# Patient Record
Sex: MALE | Race: WHITE | NOT HISPANIC OR LATINO | Employment: OTHER | URBAN - METROPOLITAN AREA
[De-identification: names, ages, dates, MRNs, and addresses within clinical notes are randomized per-mention and may not be internally consistent; named-entity substitution may affect disease eponyms.]

---

## 2017-01-06 RX ORDER — GLIMEPIRIDE 4 MG/1
4 TABLET ORAL 2 TIMES DAILY
Qty: 60 TABLET | Refills: 11 | Status: SHIPPED | OUTPATIENT
Start: 2017-01-06 | End: 2017-12-22

## 2017-02-20 ENCOUNTER — LAB VISIT (OUTPATIENT)
Dept: LAB | Facility: HOSPITAL | Age: 62
End: 2017-02-20
Attending: INTERNAL MEDICINE
Payer: MEDICARE

## 2017-02-20 DIAGNOSIS — Z51.81 ENCOUNTER FOR THERAPEUTIC DRUG MONITORING: ICD-10-CM

## 2017-02-20 DIAGNOSIS — N13.5 STRICTURE OR KINKING OF URETER: Primary | ICD-10-CM

## 2017-02-20 DIAGNOSIS — D89.9 UNSPECIFIED DISORDER OF IMMUNE MECHANISM: ICD-10-CM

## 2017-02-20 LAB
ALBUMIN SERPL BCP-MCNC: 3.2 G/DL
ALP SERPL-CCNC: 37 U/L
ALT SERPL W/O P-5'-P-CCNC: 13 U/L
ANION GAP SERPL CALC-SCNC: 11 MMOL/L
AST SERPL-CCNC: 16 U/L
BASOPHILS # BLD AUTO: 0.02 K/UL
BASOPHILS NFR BLD: 0.4 %
BILIRUB SERPL-MCNC: 0.4 MG/DL
BUN SERPL-MCNC: 26 MG/DL
CALCIUM SERPL-MCNC: 9.4 MG/DL
CHLORIDE SERPL-SCNC: 102 MMOL/L
CO2 SERPL-SCNC: 25 MMOL/L
CREAT SERPL-MCNC: 1.8 MG/DL
CRP SERPL-MCNC: 18.1 MG/L
DIFFERENTIAL METHOD: ABNORMAL
EOSINOPHIL # BLD AUTO: 0.4 K/UL
EOSINOPHIL NFR BLD: 6.6 %
ERYTHROCYTE [DISTWIDTH] IN BLOOD BY AUTOMATED COUNT: 15.3 %
ERYTHROCYTE [SEDIMENTATION RATE] IN BLOOD BY WESTERGREN METHOD: 12 MM/HR
EST. GFR  (AFRICAN AMERICAN): 45.6 ML/MIN/1.73 M^2
EST. GFR  (NON AFRICAN AMERICAN): 39.5 ML/MIN/1.73 M^2
GLUCOSE SERPL-MCNC: 226 MG/DL
HCT VFR BLD AUTO: 35.8 %
HGB BLD-MCNC: 11.5 G/DL
LYMPHOCYTES # BLD AUTO: 1.6 K/UL
LYMPHOCYTES NFR BLD: 28.6 %
MCH RBC QN AUTO: 26.9 PG
MCHC RBC AUTO-ENTMCNC: 32.1 %
MCV RBC AUTO: 84 FL
MONOCYTES # BLD AUTO: 0.5 K/UL
MONOCYTES NFR BLD: 8.2 %
NEUTROPHILS # BLD AUTO: 3.1 K/UL
NEUTROPHILS NFR BLD: 56 %
PLATELET # BLD AUTO: 264 K/UL
PMV BLD AUTO: 10.1 FL
POTASSIUM SERPL-SCNC: 4.4 MMOL/L
PROT SERPL-MCNC: 6.4 G/DL
RBC # BLD AUTO: 4.27 M/UL
SODIUM SERPL-SCNC: 138 MMOL/L
URATE SERPL-MCNC: 3.4 MG/DL
WBC # BLD AUTO: 5.48 K/UL

## 2017-02-20 PROCEDURE — 85651 RBC SED RATE NONAUTOMATED: CPT

## 2017-02-20 PROCEDURE — 80053 COMPREHEN METABOLIC PANEL: CPT

## 2017-02-20 PROCEDURE — 36415 COLL VENOUS BLD VENIPUNCTURE: CPT | Mod: PO

## 2017-02-20 PROCEDURE — 85025 COMPLETE CBC W/AUTO DIFF WBC: CPT

## 2017-02-20 PROCEDURE — 84550 ASSAY OF BLOOD/URIC ACID: CPT

## 2017-02-20 PROCEDURE — 86140 C-REACTIVE PROTEIN: CPT

## 2017-02-27 RX ORDER — SIMVASTATIN 40 MG/1
TABLET, FILM COATED ORAL
Qty: 90 TABLET | Refills: 3 | Status: SHIPPED | OUTPATIENT
Start: 2017-02-27 | End: 2017-02-27 | Stop reason: SDUPTHER

## 2017-02-27 RX ORDER — SIMVASTATIN 40 MG/1
40 TABLET, FILM COATED ORAL NIGHTLY
Qty: 90 TABLET | Refills: 3 | Status: SHIPPED | OUTPATIENT
Start: 2017-02-27 | End: 2018-03-09 | Stop reason: SDUPTHER

## 2017-03-07 ENCOUNTER — TELEPHONE (OUTPATIENT)
Dept: INTERNAL MEDICINE | Facility: CLINIC | Age: 62
End: 2017-03-07

## 2017-03-07 NOTE — TELEPHONE ENCOUNTER
----- Message from Radha Calvillo sent at 3/7/2017  1:05 PM CST -----  Contact: kristin george  called gisselle status of prednisone rx...[phone]

## 2017-03-07 NOTE — TELEPHONE ENCOUNTER
S/w Joana , pharmacist at [facility]. She wanted to see if Dr. Whitley sent in a prescription for Prednisone to pharmacy. Pt stopped by pharmacy and told them that they need to give him prednisone and that he spoke with Dr. Rouse and he was prescribing it. I advised that pt has not seen Dr. Rouse since 12/01/16 and no notes in the system. She will return call if needed/TGD

## 2017-03-17 ENCOUNTER — TELEPHONE (OUTPATIENT)
Dept: INTERNAL MEDICINE | Facility: CLINIC | Age: 62
End: 2017-03-17

## 2017-03-17 NOTE — TELEPHONE ENCOUNTER
"S/w pt. Pt is requesting an order yo have  Ostomy supplies order sent to [facility]. I called pt and he advised that " Dr. Rouse has always done this for me in the past.". He does not have a gastroenterologist. [facility] 12/01/16. [place] 06/06/17. Will you approve this? I will fill out the appropriate forms if you agree. Please let me know. Thanks/TGD  "

## 2017-03-17 NOTE — TELEPHONE ENCOUNTER
----- Message from Kasie Castanon sent at 3/17/2017  9:15 AM CDT -----  Contact: [place]/[facility]  Pt needs to have a new order for his ostomy supplies. Pls include the clinic notes as well. Fax number is [phone]. Augusta can be reached at [phone].

## 2017-03-20 ENCOUNTER — TELEPHONE (OUTPATIENT)
Dept: INTERNAL MEDICINE | Facility: CLINIC | Age: 62
End: 2017-03-20

## 2017-03-20 NOTE — TELEPHONE ENCOUNTER
Notified pt. Medical Necessity form filled out for pt and faxed with most recent chart notes to [facility]/TGD

## 2017-03-20 NOTE — TELEPHONE ENCOUNTER
----- Message from Artur Crews sent at 3/20/2017  4:38 PM CDT -----  Contact: Augusta from [facility] she is calling  needing an order and latest office visit notes for ostomy supplies/ pt is almost out and can be reached at [phone]//bridgette/dbw     Fax # [phone]

## 2017-03-20 NOTE — TELEPHONE ENCOUNTER
S/w pt's sister. She agreed to tell pt that I am sending over the order for the Ostomy supplies to [facility]. Verbalized understanding/TGD

## 2017-03-30 ENCOUNTER — TELEPHONE (OUTPATIENT)
Dept: INTERNAL MEDICINE | Facility: CLINIC | Age: 62
End: 2017-03-30

## 2017-03-30 NOTE — TELEPHONE ENCOUNTER
----- Message from Olga Martínez sent at 3/30/2017  3:54 PM CDT -----  Contact: silvia/markell loaiza  Would like to speak to nurse about pt diabetic supplies. Please call back at [phone]. thanks

## 2017-03-31 ENCOUNTER — OFFICE VISIT (OUTPATIENT)
Dept: INTERNAL MEDICINE | Facility: CLINIC | Age: 62
End: 2017-03-31
Payer: MEDICARE

## 2017-03-31 VITALS
WEIGHT: 229.25 LBS | DIASTOLIC BLOOD PRESSURE: 86 MMHG | BODY MASS INDEX: 37.57 KG/M2 | RESPIRATION RATE: 20 BRPM | SYSTOLIC BLOOD PRESSURE: 172 MMHG | TEMPERATURE: 100 F | HEART RATE: 84 BPM | OXYGEN SATURATION: 97 %

## 2017-03-31 DIAGNOSIS — L03.311 CELLULITIS OF ABDOMINAL WALL: Primary | ICD-10-CM

## 2017-03-31 PROCEDURE — 1160F RVW MEDS BY RX/DR IN RCRD: CPT | Mod: S$GLB,,,

## 2017-03-31 PROCEDURE — 3079F DIAST BP 80-89 MM HG: CPT | Mod: S$GLB,,,

## 2017-03-31 PROCEDURE — 96372 THER/PROPH/DIAG INJ SC/IM: CPT | Mod: S$GLB,,,

## 2017-03-31 PROCEDURE — 99999 PR PBB SHADOW E&M-EST. PATIENT-LVL V: CPT | Mod: PBBFAC,,,

## 2017-03-31 PROCEDURE — 99213 OFFICE O/P EST LOW 20 MIN: CPT | Mod: 25,S$GLB,,

## 2017-03-31 PROCEDURE — 3077F SYST BP >= 140 MM HG: CPT | Mod: S$GLB,,,

## 2017-03-31 RX ORDER — CEFTRIAXONE 500 MG/1
500 INJECTION, POWDER, FOR SOLUTION INTRAMUSCULAR; INTRAVENOUS ONCE
Status: COMPLETED | OUTPATIENT
Start: 2017-03-31 | End: 2017-03-31

## 2017-03-31 RX ORDER — SULFAMETHOXAZOLE AND TRIMETHOPRIM 800; 160 MG/1; MG/1
1 TABLET ORAL 2 TIMES DAILY
Qty: 20 TABLET | Refills: 0 | Status: SHIPPED | OUTPATIENT
Start: 2017-03-31 | End: 2017-04-10

## 2017-03-31 RX ADMIN — CEFTRIAXONE 500 MG: 500 INJECTION, POWDER, FOR SOLUTION INTRAMUSCULAR; INTRAVENOUS at 04:03

## 2017-03-31 NOTE — MR AVS SNAPSHOT
[facility]  [address] [facility] [address]  Phone: [phone]                  Rickey Amaya Jr.   3/31/2017 3:00 PM   Office Visit    Description:  Male : 1955   Provider:  АНДРЕЙ Ordaz   Department:  Central - Internal Medicine           Reason for Visit     Rash           Diagnoses this Visit        Comments    Cellulitis of abdominal wall    -  Primary            To Do List           Future Appointments        Provider Department Dept Phone    2017 8:20 AM LABORATORY, [facility] - Laboratory [phone]    2017 2:00 PM Octavio Rouse MD [facility] [phone]      Goals (5 Years of Data)     None       These Medications        Disp Refills Start End    sulfamethoxazole-trimethoprim 800-160mg (BACTRIM DS) 800-160 mg Tab 20 tablet 0 3/31/2017 4/10/2017    Take 1 tablet by mouth 2 (two) times daily. - Oral    Pharmacy: [facility] [address] [place] [address] [facility] Ph #: [phone]         [facility]     [facility] Nurse Care Line -  Assistance  Unless otherwise directed by your provider, please contact [facility], our nurse care line that is available for  assistance.     Registered nurses in the [facility] provide: appointment scheduling, clinical advisement, health education, and other advisory services.  Call: [phone] (toll free)               Medications           Message regarding Medications     Verify the changes and/or additions to your medication regime listed below are the same as discussed with your clinician today.  If any of these changes or additions are incorrect, please notify your healthcare provider.        START taking these NEW medications        Refills    sulfamethoxazole-trimethoprim 800-160mg (BACTRIM DS) 800-160 mg Tab 0    Sig: Take 1 tablet by mouth 2 (two) times daily.    Class: Normal    Route: Oral      These medications were  administered today        Dose Freq    cefTRIAXone injection 500 mg 500 mg Once    Sig: Inject 0.5 g (500 mg total) into the muscle once.    Class: Normal    Route: Intramuscular           Verify that the below list of medications is an accurate representation of the medications you are currently taking.  If none reported, the list may be blank. If incorrect, please contact your healthcare provider. Carry this list with you in case of emergency.           Current Medications     allopurinol (ZYLOPRIM) 300 MG tablet     aspirin 81 mg Tab Take by mouth. 1 Tablet Oral Every day    blood sugar diagnostic (TRUETEST TEST STRIPS) Strp 1 strip by Misc.(Non-Drug; Combo Route) route 2 (two) times daily.    blood-glucose meter kit True Result Glucometer. Use as instructed    cinnamon bark (CINNAMON) 500 mg capsule Take 3,000 mg by mouth.    fenofibrate 160 MG Tab TAKE ONE TABLET BY MOUTH ONCE DAILY EVERY MORNING    ferrous sulfate 134 mg (27 mg iron) Tab Take 1 tablet by mouth 2 (two) times daily.    glimepiride (AMARYL) 4 MG tablet Take 1 tablet (4 mg total) by mouth 2 (two) times daily.    hydrochlorothiazide (HYDRODIURIL) 25 MG tablet Take 1 tablet (25 mg total) by mouth once daily. 1 Tablet Oral Every day    hydrOXYzine pamoate (VISTARIL) 25 MG Cap Take 1 capsule (25 mg total) by mouth every 8 (eight) hours as needed.    hyoscyamine (LEVSIN/SL) 0.125 mg Subl     metoprolol succinate (TOPROL-XL) 100 MG 24 hr tablet TAKE ONE TABLET BY MOUTH ONCE DAILY    multivitamin (THERAGRAN) per tablet Take by mouth. 1 Tablet Oral Every day    MULTIVITAMIN ORAL Take by mouth.    mycophenolate (CELLCEPT) 500 mg Tab Take 3 tablets (1,500 mg total) by mouth 2 (two) times daily.    nystatin-triamcinolone (MYCOLOG II) cream     omega 3-dha-epa-fish oil (OMEGA 3 FISH OIL) 900-1,400 mg CpDR Take 2 capsules by mouth 2 (two) times daily.    omega-3 fatty acids-vitamin E (FISH OIL) 1,000 mg Cap Take by mouth. 1 Capsule Oral Twice a day     oxybutynin (DITROPAN-XL) 10 MG 24 hr tablet     simvastatin (ZOCOR) 40 MG tablet Take 1 tablet (40 mg total) by mouth every evening.    tamsulosin (FLOMAX) 0.4 mg Cp24 Take by mouth. 1 Capsule, Ext Release 24 hr Oral At bedtime    TIZANIDINE HCL (TIZANIDINE ORAL) Take by mouth. 1 Capsule Oral Every day    triamcinolone acetonide 0.025% (KENALOG) 0.025 % cream Apply topically 2 (two) times daily.    sulfamethoxazole-trimethoprim 800-160mg (BACTRIM DS) 800-160 mg Tab Take 1 tablet by mouth 2 (two) times daily.           Clinical Reference Information           Your Vitals Were     BP Pulse Temp Resp    172/86 (BP Location: Left arm, Patient Position: Sitting, BP Method: Manual) 84 99.5 °F (37.5 °C) (Tympanic) 20    Weight SpO2 BMI    104 kg (229 lb 4.5 oz) 97% 37.57 kg/m2      Blood Pressure          Most Recent Value    BP  (!)  172/86 [he has been on steriods and this causes his BP to be elevate]      Allergies as of 3/31/2017     Diazepam    Hydrocodone-acetaminophen    Metformin    Morphine    Propoxyphene N-acetaminophen    Propoxyphene-acetaminophen      Immunizations Administered on Date of Encounter - 3/31/2017     None      Administrations This Visit     cefTRIAXone injection 500 mg     Admin Date Action Dose Route Administered By             03/31/2017 Given 500 mg Intramuscular YAZ [facility] Sign-Up     Activating your [facility] account is as easy as 1-2-3!     1) Visit [email or web address], select Sign Up Now, enter this activation code and your date of birth, then select Next.  Activation code not generated  Current Patient Portal Status: Account disabled      2) Create a username and password to use when you visit [facility] in the future and select a security question in case you lose your password and select Next.    3) Enter your e-mail address and click Sign Up!    Additional Information  If you have questions, please e-mail [email or web address] or call  [phone] to talk to our [facility] staff. Remember, [facility] is NOT to be used for urgent needs. For medical emergencies, dial 911.         Language Assistance Services     ATTENTION: Language assistance services are available, free of charge. Please call [phone].      ATENCIÓN: Si habla esther, tiene a chester disposición servicios gratuitos de asistencia lingüística. Llame al [phone].     [facility] Ý: N?u b?n nói Ti?ng Vi?t, có các d?ch v? h? tr? ngôn ng? mi?n phí dành cho b?n. G?i s? [phone].         [facility] complies with applicable Federal civil rights laws and does not discriminate on the basis of race, color, national origin, age, disability, or sex.

## 2017-03-31 NOTE — PROGRESS NOTES
Subjective:       Patient ID: Rickey Amaya Jr. is a 62 y.o. male.    Chief Complaint: Rash (to right side, he believes it may be cellulitis , he gets this often and has to be treated with antibiotics )    HPI   Patient presents with a 2 day complaint of redness erythema and tenderness to his right abdominal wall.  The patient has been diagnosed in the past with cellulitis.  This is a recurrent problem he is seen fairly frequently for this.  He was told that it can be caused by the ileostomy bag on his side.  He has not taken anything for the pain.  He says his symptoms are constant and moderate in intensity.  He denies any associated symptoms.  He cannot identify exacerbating or mitigating factors.     Review of Systems   Constitutional: Negative for activity change, appetite change, fatigue and unexpected weight change.   HENT: Negative.    Eyes: Negative.    Respiratory: Negative for cough, chest tightness, shortness of breath and wheezing.    Cardiovascular: Negative for chest pain, palpitations and leg swelling.   Gastrointestinal: Negative for constipation, diarrhea, nausea and vomiting.   Endocrine: Negative.    Genitourinary: Negative.    Musculoskeletal: Negative.    Skin: Positive for color change.        Erythema and tenderness to palpation on right abdominal wall   Allergic/Immunologic: Negative.    Neurological: Negative for dizziness, weakness and light-headedness.   Hematological: Negative.    Psychiatric/Behavioral: Negative for sleep disturbance.         Objective:      Physical Exam   Constitutional: He is oriented to person, place, and time. Vital signs are normal. He appears well-developed and well-nourished. He is active and cooperative. No distress.   HENT:   Head: Normocephalic and atraumatic.   Eyes: Conjunctivae are normal. Pupils are equal, round, and reactive to light. No scleral icterus.   Neck: Normal range of motion. Neck supple.   Cardiovascular: Normal rate, regular rhythm, normal  heart sounds and intact distal pulses.  Exam reveals no gallop and no friction rub.    No murmur heard.  Pulmonary/Chest: Effort normal and breath sounds normal. No respiratory distress. He has no wheezes. He has no rales. He exhibits no tenderness.   Abdominal:       Musculoskeletal: Normal range of motion. He exhibits no edema or tenderness.   Neurological: He is alert and oriented to person, place, and time. He exhibits normal muscle tone. Coordination normal.   Skin: Skin is warm and dry. No rash noted. He is not diaphoretic. No erythema. No pallor.   Psychiatric: He has a normal mood and affect. His speech is normal and behavior is normal. Judgment and thought content normal.       Assessment:       1. Cellulitis of abdominal wall          Plan:   Cellulitis of abdominal wall  -     sulfamethoxazole-trimethoprim 800-160mg (BACTRIM DS) 800-160 mg Tab; Take 1 tablet by mouth 2 (two) times daily.  Dispense: 20 tablet; Refill: 0  -     cefTRIAXone injection 500 mg; Inject 0.5 g (500 mg total) into the muscle once.            Disclaimer: This note is prepared using voice recognition software.  As such there may be errors in the dictation.  It has not been proofread.

## 2017-03-31 NOTE — TELEPHONE ENCOUNTER
----- Message from Vita Alvarez sent at 3/31/2017 12:24 PM CDT -----  Call pt at [phone]//pt say call him, it urgent please//melia whitmore

## 2017-04-18 ENCOUNTER — TELEPHONE (OUTPATIENT)
Dept: INTERNAL MEDICINE | Facility: CLINIC | Age: 62
End: 2017-04-18

## 2017-04-18 ENCOUNTER — OFFICE VISIT (OUTPATIENT)
Dept: INTERNAL MEDICINE | Facility: CLINIC | Age: 62
End: 2017-04-18
Payer: MEDICARE

## 2017-04-18 VITALS
WEIGHT: 224.44 LBS | TEMPERATURE: 98 F | HEIGHT: 66 IN | DIASTOLIC BLOOD PRESSURE: 82 MMHG | BODY MASS INDEX: 36.07 KG/M2 | OXYGEN SATURATION: 95 % | SYSTOLIC BLOOD PRESSURE: 138 MMHG | HEART RATE: 88 BPM

## 2017-04-18 DIAGNOSIS — R11.2 NON-INTRACTABLE VOMITING WITH NAUSEA, UNSPECIFIED VOMITING TYPE: ICD-10-CM

## 2017-04-18 DIAGNOSIS — R10.9 ACUTE ABDOMINAL PAIN: Primary | ICD-10-CM

## 2017-04-18 DIAGNOSIS — Z93.3 COLOSTOMY STATUS: ICD-10-CM

## 2017-04-18 PROCEDURE — 1160F RVW MEDS BY RX/DR IN RCRD: CPT | Mod: S$GLB,,, | Performed by: FAMILY MEDICINE

## 2017-04-18 PROCEDURE — 99999 PR PBB SHADOW E&M-EST. PATIENT-LVL III: CPT | Mod: PBBFAC,,, | Performed by: FAMILY MEDICINE

## 2017-04-18 PROCEDURE — 3079F DIAST BP 80-89 MM HG: CPT | Mod: S$GLB,,, | Performed by: FAMILY MEDICINE

## 2017-04-18 PROCEDURE — 99213 OFFICE O/P EST LOW 20 MIN: CPT | Mod: S$GLB,,, | Performed by: FAMILY MEDICINE

## 2017-04-18 PROCEDURE — 3075F SYST BP GE 130 - 139MM HG: CPT | Mod: S$GLB,,, | Performed by: FAMILY MEDICINE

## 2017-04-18 NOTE — PROGRESS NOTES
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  He's had several episodes of vomiting.  Denies any fever he's had a colostomy for colon cancer many years back and also has a diagnosis of retroperitoneal fibrosis.      ROS:  Review of Systems   Constitutional: Negative for activity change, chills, fatigue, fever and unexpected weight change.   HENT: Negative for congestion, ear discharge, ear pain, hearing loss, postnasal drip and rhinorrhea.    Eyes: Negative for pain and visual disturbance.   Respiratory: Negative for cough, chest tightness and shortness of breath.    Cardiovascular: Negative for chest pain and palpitations.   Gastrointestinal: Positive for abdominal distention, abdominal pain, constipation, nausea and vomiting. Negative for diarrhea.   Endocrine: Negative for heat intolerance.   Genitourinary: Negative for dysuria, flank pain, frequency and hematuria.   Musculoskeletal: Negative for back pain, gait problem and neck pain.   Skin: Negative for color change and rash.   Neurological: Negative for dizziness, tremors, seizures, numbness and headaches.   Psychiatric/Behavioral: Negative for agitation, hallucinations, self-injury, sleep disturbance and suicidal ideas. The patient is not nervous/anxious.        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, stage III     Calculus of kidney     Chronic kidney disease, stage III (moderate)     Colostomy status     DM (diabetes mellitus), type 2 with complications     DM type 2 causing CKD stage 3     DM type 2 with diabetic mixed hyperlipidemia     History of colon cancer 2005    History of gout     Hyperlipidemia  "associated with type 2 diabetes mellitus     Hypertension associated with diabetes     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       Social History:  Social History     Social History    Marital status:      Spouse name: N/A    Number of children: N/A    Years of education: N/A     Social History Main Topics    Smoking status: Former Smoker     Packs/day: 2.50     Years: 35.00     Types: Cigarettes     Quit date: 7/7/2005    Smokeless tobacco: Never Used    Alcohol use No    Drug use: No    Sexual activity: Not Asked     Other Topics Concern    None     Social History Narrative       Family History:   family history includes Diabetes in his mother.    Health Maintenance   Topic Date Due    TETANUS VACCINE  03/24/1972    Pneumococcal PPSV23 (Medium Risk) (1) 03/24/1972    Zoster Vaccine  03/24/2014    PROSTATE-SPECIFIC ANTIGEN  08/21/2015    Foot Exam  09/02/2016    Eye Exam  10/01/2016    Hemoglobin A1c  05/28/2017    Urine Microalbumin  07/28/2017    Lipid Panel  11/28/2017    Colonoscopy  01/27/2026    Hepatitis C Screening  Completed    Influenza Vaccine  Completed       Physical Exam:    Vital Signs  Temp: 98.3 °F (36.8 °C)  Temp src: Tympanic  Pulse: 88  SpO2: 95 %  BP: 138/82  Pain Score:   8  Pain Loc: Abdomen  Height and Weight  Height: 5' 6" (167.6 cm)  Weight: 101.8 kg (224 lb 6.9 oz)  BSA (Calculated - sq m): 2.18 sq meters  BMI (Calculated): 36.3  Weight in (lb) to have BMI = 25: 154.6]    Body mass index is 36.22 kg/(m^2).    Physical Exam   Constitutional: He appears well-developed. He appears ill.   HENT:   Mouth/Throat: Oropharynx is clear and moist.   Eyes: Conjunctivae are normal. Pupils are equal, round, and reactive to light.   Neck: Normal range of motion. Neck supple.   Cardiovascular: Normal rate, regular rhythm and normal heart sounds.    No murmur heard.  Pulmonary/Chest: Effort normal and breath sounds normal. No respiratory distress. He has no wheezes. He has " no rales. He exhibits no tenderness.   Abdominal: Soft. He exhibits no distension and no mass. There is generalized tenderness. There is no guarding.   Musculoskeletal: He exhibits no edema or tenderness.   Lymphadenopathy:     He has no cervical adenopathy.   Neurological: He is alert.   Skin: Skin is warm and dry.   Psychiatric: He has a normal mood and affect. His behavior is normal. Judgment and thought content normal.       Lab Results   Component Value Date    CHOL 118 (L) 11/28/2016    CHOL 152 07/28/2016    CHOL 142 12/04/2015    TRIG 264 (H) 11/28/2016    TRIG 422 (H) 07/28/2016    TRIG 177 (H) 12/04/2015    HDL 30 (L) 11/28/2016    HDL 33 (L) 07/28/2016    HDL 40 12/04/2015    TOTALCHOLEST 3.9 11/28/2016    TOTALCHOLEST 4.6 07/28/2016    TOTALCHOLEST 3.6 12/04/2015    NONHDLCHOL 88 11/28/2016    NONHDLCHOL 119 07/28/2016    NONHDLCHOL 102 12/04/2015       Lab Results   Component Value Date    HGBA1C 7.8 (H) 11/28/2016       Assessment:      ICD-10-CM ICD-9-CM   1. Acute abdominal pain R10.9 789.00     338.19   2. Non-intractable vomiting with nausea, unspecified vomiting type R11.2 787.01   3. Colostomy status Z93.3 V44.3         Plan:  Patient will need urgent ED evaluation, suspect small bowel obstruction.  He will be sent to ED via ambulance  to [facility]    No orders of the defined types were placed in this encounter.      Current Outpatient Prescriptions   Medication Sig Dispense Refill    allopurinol (ZYLOPRIM) 300 MG tablet       aspirin 81 mg Tab Take by mouth. 1 Tablet Oral Every day      blood sugar diagnostic (TRUETEST TEST STRIPS) Strp 1 strip by Misc.(Non-Drug; Combo Route) route 2 (two) times daily. 200 strip 3    blood-glucose meter kit True Result Glucometer. Use as instructed 1 each 0    cinnamon bark (CINNAMON) 500 mg capsule Take 3,000 mg by mouth.      fenofibrate 160 MG Tab TAKE ONE TABLET BY MOUTH ONCE DAILY EVERY MORNING 30 tablet 11    ferrous sulfate 134 mg  (27 mg iron) Tab Take 1 tablet by mouth 2 (two) times daily.      glimepiride (AMARYL) 4 MG tablet Take 1 tablet (4 mg total) by mouth 2 (two) times daily. 60 tablet 11    hydrochlorothiazide (HYDRODIURIL) 25 MG tablet Take 1 tablet (25 mg total) by mouth once daily. 1 Tablet Oral Every day 30 tablet 11    hydrOXYzine pamoate (VISTARIL) 25 MG Cap Take 1 capsule (25 mg total) by mouth every 8 (eight) hours as needed. 90 capsule 11    hyoscyamine (LEVSIN/SL) 0.125 mg Subl       metoprolol succinate (TOPROL-XL) 100 MG 24 hr tablet TAKE ONE TABLET BY MOUTH ONCE DAILY 90 tablet 3    multivitamin (THERAGRAN) per tablet Take by mouth. 1 Tablet Oral Every day      nystatin-triamcinolone (MYCOLOG II) cream       omega 3-dha-epa-fish oil (OMEGA 3 FISH OIL) 900-1,400 mg CpDR Take 2 capsules by mouth 2 (two) times daily.      oxybutynin (DITROPAN-XL) 10 MG 24 hr tablet       simvastatin (ZOCOR) 40 MG tablet Take 1 tablet (40 mg total) by mouth every evening. 90 tablet 3    tamsulosin (FLOMAX) 0.4 mg Cp24 Take by mouth. 1 Capsule, Ext Release 24 hr Oral At bedtime      TIZANIDINE HCL (TIZANIDINE ORAL) Take by mouth. 1 Capsule Oral Every day      MULTIVITAMIN ORAL Take by mouth.      mycophenolate (CELLCEPT) 500 mg Tab Take 3 tablets (1,500 mg total) by mouth 2 (two) times daily. (Patient taking differently: Take 2,000 mg by mouth 2 (two) times daily. 2 tablets in AM and 2 tablets in PM) 180 tablet 11    omega-3 fatty acids-vitamin E (FISH OIL) 1,000 mg Cap Take by mouth. 1 Capsule Oral Twice a day      triamcinolone acetonide 0.025% (KENALOG) 0.025 % cream Apply topically 2 (two) times daily. 15 g 0     No current facility-administered medications for this visit.        There are no discontinued medications.    No Follow-up on file.      Dr John Alonso MD    Disclaimer: This note is prepared using voice recognition system and as such is likely to have errors and is not proof read.

## 2017-04-18 NOTE — TELEPHONE ENCOUNTER
Spoke with pt.  He reports that he has a colostomy bag and that he has taken a bottle of Mag citrate and no results.  Informed pt to come in at 11:00am today to be seen.  Pt voiced understanding.

## 2017-04-18 NOTE — MR AVS SNAPSHOT
[facility]  [id number] [place] [address]  Phone: [phone]                  Rickey Amaya Jr.   2017 4:00 PM   Office Visit    Description:  Male : 1955   Provider:  John Alonso MD   Department:  Central - Internal Medicine           Reason for Visit     Abdominal Pain           Diagnoses this Visit        Comments    Acute abdominal pain    -  Primary     Non-intractable vomiting with nausea, unspecified vomiting type         Colostomy status                To Do List           Future Appointments        Provider Department Dept Phone    2017 4:00 PM John Alonso MD [facility] [phone]    2017 8:20 AM LABORATORY, [facility] [phone]    2017 2:00 PM Octavio Rouse MD [facility] [phone]      Goals (5 Years of Data)     None      [facility] On Call     [facility] On Call Nurse Care Line -  Assistance  Unless otherwise directed by your provider, please contact [facility], our nurse care line that is available for  assistance.     Registered nurses in the [facility] On Call Center provide: appointment scheduling, clinical advisement, health education, and other advisory services.  Call: [phone] (toll free)               Medications           Message regarding Medications     Verify the changes and/or additions to your medication regime listed below are the same as discussed with your clinician today.  If any of these changes or additions are incorrect, please notify your healthcare provider.             Verify that the below list of medications is an accurate representation of the medications you are currently taking.  If none reported, the list may be blank. If incorrect, please contact your healthcare provider. Carry this list with you in case of emergency.           Current Medications     allopurinol (ZYLOPRIM) 300 MG tablet     aspirin 81 mg Tab Take by mouth. 1  "Tablet Oral Every day    blood sugar diagnostic (TRUETEST TEST STRIPS) Strp 1 strip by Misc.(Non-Drug; Combo Route) route 2 (two) times daily.    blood-glucose meter kit True Result Glucometer. Use as instructed    cinnamon bark (CINNAMON) 500 mg capsule Take 3,000 mg by mouth.    fenofibrate 160 MG Tab TAKE ONE TABLET BY MOUTH ONCE DAILY EVERY MORNING    ferrous sulfate 134 mg (27 mg iron) Tab Take 1 tablet by mouth 2 (two) times daily.    glimepiride (AMARYL) 4 MG tablet Take 1 tablet (4 mg total) by mouth 2 (two) times daily.    hydrochlorothiazide (HYDRODIURIL) 25 MG tablet Take 1 tablet (25 mg total) by mouth once daily. 1 Tablet Oral Every day    hydrOXYzine pamoate (VISTARIL) 25 MG Cap Take 1 capsule (25 mg total) by mouth every 8 (eight) hours as needed.    hyoscyamine (LEVSIN/SL) 0.125 mg Subl     metoprolol succinate (TOPROL-XL) 100 MG 24 hr tablet TAKE ONE TABLET BY MOUTH ONCE DAILY    multivitamin (THERAGRAN) per tablet Take by mouth. 1 Tablet Oral Every day    nystatin-triamcinolone (MYCOLOG II) cream     omega 3-dha-epa-fish oil (OMEGA 3 FISH OIL) 900-1,400 mg CpDR Take 2 capsules by mouth 2 (two) times daily.    oxybutynin (DITROPAN-XL) 10 MG 24 hr tablet     simvastatin (ZOCOR) 40 MG tablet Take 1 tablet (40 mg total) by mouth every evening.    tamsulosin (FLOMAX) 0.4 mg Cp24 Take by mouth. 1 Capsule, Ext Release 24 hr Oral At bedtime    TIZANIDINE HCL (TIZANIDINE ORAL) Take by mouth. 1 Capsule Oral Every day    MULTIVITAMIN ORAL Take by mouth.    mycophenolate (CELLCEPT) 500 mg Tab Take 3 tablets (1,500 mg total) by mouth 2 (two) times daily.    omega-3 fatty acids-vitamin E (FISH OIL) 1,000 mg Cap Take by mouth. 1 Capsule Oral Twice a day    triamcinolone acetonide 0.025% (KENALOG) 0.025 % cream Apply topically 2 (two) times daily.           Clinical Reference Information           Your Vitals Were     BP Pulse Temp Height Weight SpO2    138/82 88 98.3 °F (36.8 °C) (Tympanic) 5' 6" (1.676 m) 101.8 " kg (224 lb 6.9 oz) 95%    BMI                36.22 kg/m2          Blood Pressure          Most Recent Value    BP  138/82      Allergies as of 4/18/2017     Diazepam    Hydrocodone-acetaminophen    Metformin    Morphine    Propoxyphene N-acetaminophen    Propoxyphene-acetaminophen      Immunizations Administered on Date of Encounter - 4/18/2017     None      [facility] Sign-Up     Activating your [facility] account is as easy as 1-2-3!     1) Visit [email or web address], select Sign Up Now, enter this activation code and your date of birth, then select Next.  Activation code not generated  Current Patient Portal Status: Account disabled      2) Create a username and password to use when you visit [facility] in the future and select a security question in case you lose your password and select Next.    3) Enter your e-mail address and click Sign Up!    Additional Information  If you have questions, please e-mail [email or web address] or call [phone] to talk to our [facility] staff. Remember, [facility] is NOT to be used for urgent needs. For medical emergencies, dial 911.         Language Assistance Services     ATTENTION: Language assistance services are available, free of charge. Please call [phone].      ATENCIÓN: Si habla español, tiene a chester disposición servicios gratuitos de asistencia lingüística. Llame al [phone].     CHÚ Ý: N?u b?n nói Ti?ng Vi?t, có các d?ch v? h? tr? ngôn ng? mi?n phí dành cho b?n. G?i s? [phone].         [facility] complies with applicable Federal civil rights laws and does not discriminate on the basis of race, color, national origin, age, disability, or sex.

## 2017-04-18 NOTE — TELEPHONE ENCOUNTER
----- Message from Beatrice Hong sent at 4/18/2017  9:28 AM CDT -----  Contact: Patient  Patient needs to speak to nurse about his stomach issues, he does have an appointment today at 4:00 but still wants to speak to nurse, please call him back at [phone]. Thank you

## 2017-04-24 ENCOUNTER — TELEPHONE (OUTPATIENT)
Dept: INTERNAL MEDICINE | Facility: CLINIC | Age: 62
End: 2017-04-24

## 2017-04-24 NOTE — TELEPHONE ENCOUNTER
----- Message from Shalini Weiss LPN sent at 4/24/2017  2:36 PM CDT -----  I faxed a medical neccesity form over on this pt that I received from [employer]. Pt is requesting deodorizer and needs dr to sign and fax form.   Thanks Fadumo

## 2017-05-26 ENCOUNTER — LAB VISIT (OUTPATIENT)
Dept: LAB | Facility: HOSPITAL | Age: 62
End: 2017-05-26
Attending: INTERNAL MEDICINE
Payer: MEDICARE

## 2017-05-26 ENCOUNTER — OFFICE VISIT (OUTPATIENT)
Dept: INTERNAL MEDICINE | Facility: CLINIC | Age: 62
End: 2017-05-26
Payer: MEDICARE

## 2017-05-26 VITALS
HEART RATE: 97 BPM | TEMPERATURE: 102 F | OXYGEN SATURATION: 97 % | HEIGHT: 66 IN | SYSTOLIC BLOOD PRESSURE: 152 MMHG | DIASTOLIC BLOOD PRESSURE: 80 MMHG | BODY MASS INDEX: 35.04 KG/M2 | WEIGHT: 218.06 LBS

## 2017-05-26 DIAGNOSIS — E11.59 HYPERTENSION ASSOCIATED WITH DIABETES: ICD-10-CM

## 2017-05-26 DIAGNOSIS — E11.8 TYPE 2 DIABETES MELLITUS WITH COMPLICATION, WITHOUT LONG-TERM CURRENT USE OF INSULIN: ICD-10-CM

## 2017-05-26 DIAGNOSIS — N18.30 TYPE 2 DIABETES MELLITUS WITH STAGE 3 CHRONIC KIDNEY DISEASE, WITHOUT LONG-TERM CURRENT USE OF INSULIN: ICD-10-CM

## 2017-05-26 DIAGNOSIS — L03.311 CELLULITIS, ABDOMINAL WALL: Primary | ICD-10-CM

## 2017-05-26 DIAGNOSIS — E11.22 TYPE 2 DIABETES MELLITUS WITH STAGE 3 CHRONIC KIDNEY DISEASE, WITHOUT LONG-TERM CURRENT USE OF INSULIN: ICD-10-CM

## 2017-05-26 DIAGNOSIS — E11.69 DM TYPE 2 WITH DIABETIC MIXED HYPERLIPIDEMIA: ICD-10-CM

## 2017-05-26 DIAGNOSIS — I13.10 BENIGN HYPERTENSIVE HEART AND KIDNEY DISEASE WITH CHRONIC KIDNEY DISEASE, STAGE III: ICD-10-CM

## 2017-05-26 DIAGNOSIS — I15.2 HYPERTENSION ASSOCIATED WITH DIABETES: ICD-10-CM

## 2017-05-26 DIAGNOSIS — Z87.39 HISTORY OF GOUT: ICD-10-CM

## 2017-05-26 DIAGNOSIS — N18.30 BENIGN HYPERTENSIVE HEART AND KIDNEY DISEASE WITH CHRONIC KIDNEY DISEASE, STAGE III: ICD-10-CM

## 2017-05-26 DIAGNOSIS — E78.2 DM TYPE 2 WITH DIABETIC MIXED HYPERLIPIDEMIA: ICD-10-CM

## 2017-05-26 LAB
ALBUMIN SERPL BCP-MCNC: 3.5 G/DL
ALP SERPL-CCNC: 31 U/L
ALT SERPL W/O P-5'-P-CCNC: 12 U/L
ANION GAP SERPL CALC-SCNC: 10 MMOL/L
AST SERPL-CCNC: 19 U/L
BASOPHILS # BLD AUTO: 0.02 K/UL
BASOPHILS NFR BLD: 0.3 %
BILIRUB SERPL-MCNC: 0.4 MG/DL
BUN SERPL-MCNC: 35 MG/DL
CALCIUM SERPL-MCNC: 9.1 MG/DL
CHLORIDE SERPL-SCNC: 108 MMOL/L
CHOLEST/HDLC SERPL: 3.8 {RATIO}
CO2 SERPL-SCNC: 20 MMOL/L
CREAT SERPL-MCNC: 2.8 MG/DL
DIFFERENTIAL METHOD: ABNORMAL
EOSINOPHIL # BLD AUTO: 0.4 K/UL
EOSINOPHIL NFR BLD: 5 %
ERYTHROCYTE [DISTWIDTH] IN BLOOD BY AUTOMATED COUNT: 15.2 %
EST. GFR  (AFRICAN AMERICAN): 26.5 ML/MIN/1.73 M^2
EST. GFR  (NON AFRICAN AMERICAN): 23 ML/MIN/1.73 M^2
GLUCOSE SERPL-MCNC: 138 MG/DL
HCT VFR BLD AUTO: 36.2 %
HDL/CHOLESTEROL RATIO: 26.6 %
HDLC SERPL-MCNC: 124 MG/DL
HDLC SERPL-MCNC: 33 MG/DL
HGB BLD-MCNC: 11.7 G/DL
LDLC SERPL CALC-MCNC: 19.6 MG/DL
LYMPHOCYTES # BLD AUTO: 1.8 K/UL
LYMPHOCYTES NFR BLD: 25.6 %
MCH RBC QN AUTO: 27.1 PG
MCHC RBC AUTO-ENTMCNC: 32.3 %
MCV RBC AUTO: 84 FL
MONOCYTES # BLD AUTO: 0.4 K/UL
MONOCYTES NFR BLD: 5.3 %
NEUTROPHILS # BLD AUTO: 4.5 K/UL
NEUTROPHILS NFR BLD: 63.8 %
NONHDLC SERPL-MCNC: 91 MG/DL
PLATELET # BLD AUTO: 311 K/UL
PMV BLD AUTO: 10.4 FL
POTASSIUM SERPL-SCNC: 4.4 MMOL/L
PROT SERPL-MCNC: 6.8 G/DL
RBC # BLD AUTO: 4.32 M/UL
SODIUM SERPL-SCNC: 138 MMOL/L
TRIGL SERPL-MCNC: 357 MG/DL
TSH SERPL DL<=0.005 MIU/L-ACNC: 2.29 UIU/ML
URATE SERPL-MCNC: 3.8 MG/DL
WBC # BLD AUTO: 6.99 K/UL

## 2017-05-26 PROCEDURE — 3045F PR MOST RECENT HEMOGLOBIN A1C LEVEL 7.0-9.0%: CPT | Mod: S$GLB,,, | Performed by: INTERNAL MEDICINE

## 2017-05-26 PROCEDURE — 99999 PR PBB SHADOW E&M-EST. PATIENT-LVL III: CPT | Mod: PBBFAC,,, | Performed by: INTERNAL MEDICINE

## 2017-05-26 PROCEDURE — 99214 OFFICE O/P EST MOD 30 MIN: CPT | Mod: 25,S$GLB,, | Performed by: INTERNAL MEDICINE

## 2017-05-26 PROCEDURE — 96372 THER/PROPH/DIAG INJ SC/IM: CPT | Mod: 59,S$GLB,, | Performed by: INTERNAL MEDICINE

## 2017-05-26 PROCEDURE — 3060F POS MICROALBUMINURIA REV: CPT | Mod: 8P,S$GLB,, | Performed by: INTERNAL MEDICINE

## 2017-05-26 RX ORDER — SULFAMETHOXAZOLE AND TRIMETHOPRIM 800; 160 MG/1; MG/1
1 TABLET ORAL 2 TIMES DAILY
Qty: 20 TABLET | Refills: 0 | Status: SHIPPED | OUTPATIENT
Start: 2017-05-26 | End: 2017-08-10

## 2017-05-26 RX ORDER — CEFTRIAXONE 1 G/1
1 INJECTION, POWDER, FOR SOLUTION INTRAMUSCULAR; INTRAVENOUS
Status: COMPLETED | OUTPATIENT
Start: 2017-05-26 | End: 2017-05-26

## 2017-05-26 RX ADMIN — CEFTRIAXONE 1 G: 1 INJECTION, POWDER, FOR SOLUTION INTRAMUSCULAR; INTRAVENOUS at 04:05

## 2017-05-26 NOTE — PROGRESS NOTES
Ceftriaxone 1 gram reconstituted with Lidocaine 1% 2.1 ml.  IM right ventrogluteal.  Lot# [id number] [place] 08/2019 [facility] ([place]?).  Pt advised to wait in clinic 20 minutes to monitor for side effects.  Pt voiced understanding and tolerated injection well.

## 2017-05-26 NOTE — PROGRESS NOTES
"HPI:  Pt is a 61 y/o male with a hx of recurrent abd wall cellulitis felt to be due to his colostomy site. Last episode two mths ago. Presents today with c/o red, warm rash with fever for last 12 hours.     Current meds have been verified and updated per the EMR  Exam:BP (!) 152/80   Pulse 97   Temp (!) 101.9 °F (38.8 °C) (Oral)   Ht 5' 6" (1.676 m)   Wt 98.9 kg (218 lb 0.6 oz)   SpO2 97%   BMI 35.19 kg/m²   Has cellulitic rash over most of his lateral right abdominal wall/flank    Looks good, normal MS, non toxic appearance.         Impression:  cellultitis  Patient Active Problem List   Diagnosis    Calculus of kidney    History of colon cancer    Retroperitoneal fibrosis    DM type 2 causing CKD stage 3    DM type 2 with diabetic mixed hyperlipidemia    Anemia in chronic kidney disease    Colostomy status    Hypertension associated with diabetes    DM (diabetes mellitus), type 2 with complications    Benign hypertensive heart and kidney disease with chronic kidney disease, stage III    Hyperlipidemia associated with type 2 diabetes mellitus    Chronic kidney disease, stage III (moderate)    History of gout       Plan:  Orders Placed This Encounter    cefTRIAXone injection 1 g    sulfamethoxazole-trimethoprim 800-160mg (BACTRIM DS) 800-160 mg Tab     Given rocephin 1gram will start on bactrim bid. He should call me in 72 hours if not improved. If gets worse he should go to the ER.     "

## 2017-05-27 LAB
ESTIMATED AVG GLUCOSE: 180 MG/DL
HBA1C MFR BLD HPLC: 7.9 %

## 2017-05-29 ENCOUNTER — TELEPHONE (OUTPATIENT)
Dept: INTERNAL MEDICINE | Facility: CLINIC | Age: 62
End: 2017-05-29

## 2017-05-29 RX ORDER — TAMOXIFEN CITRATE 20 MG/1
20 TABLET ORAL 2 TIMES DAILY
COMMUNITY
End: 2017-11-27

## 2017-05-29 NOTE — TELEPHONE ENCOUNTER
----- Message from Radha Calvillo sent at 5/29/2017  8:59 AM CDT -----  needs callback rg meds (refused to elaborate)...[phone] (home)

## 2017-05-29 NOTE — TELEPHONE ENCOUNTER
Patient had to reschedule his June appointment also he's on  dosage of tamoxifen 20 mg twice daily. Per Dr. Owens for retroperitoneal fibrosis.

## 2017-06-02 ENCOUNTER — TELEPHONE (OUTPATIENT)
Dept: INTERNAL MEDICINE | Facility: CLINIC | Age: 62
End: 2017-06-02

## 2017-06-02 DIAGNOSIS — N18.9 CHRONIC KIDNEY DISEASE, UNSPECIFIED STAGE: ICD-10-CM

## 2017-06-02 NOTE — TELEPHONE ENCOUNTER
----- Message from Radha Calvillo sent at 6/2/2017 11:45 AM CDT -----  needs callback rg procedure, will elaborate..[phone] (home)

## 2017-06-16 ENCOUNTER — OFFICE VISIT (OUTPATIENT)
Dept: INTERNAL MEDICINE | Facility: CLINIC | Age: 62
End: 2017-06-16
Payer: MEDICARE

## 2017-06-16 ENCOUNTER — LAB VISIT (OUTPATIENT)
Dept: LAB | Facility: HOSPITAL | Age: 62
End: 2017-06-16
Attending: INTERNAL MEDICINE
Payer: MEDICARE

## 2017-06-16 VITALS
HEART RATE: 73 BPM | OXYGEN SATURATION: 98 % | WEIGHT: 211.88 LBS | BODY MASS INDEX: 34.05 KG/M2 | TEMPERATURE: 100 F | DIASTOLIC BLOOD PRESSURE: 78 MMHG | SYSTOLIC BLOOD PRESSURE: 132 MMHG | HEIGHT: 66 IN

## 2017-06-16 DIAGNOSIS — E11.8 TYPE 2 DIABETES MELLITUS WITH COMPLICATION, WITHOUT LONG-TERM CURRENT USE OF INSULIN: ICD-10-CM

## 2017-06-16 DIAGNOSIS — E11.69 HYPERLIPIDEMIA ASSOCIATED WITH TYPE 2 DIABETES MELLITUS: ICD-10-CM

## 2017-06-16 DIAGNOSIS — Z87.39 HISTORY OF GOUT: ICD-10-CM

## 2017-06-16 DIAGNOSIS — D63.1 ANEMIA IN CHRONIC KIDNEY DISEASE(285.21): ICD-10-CM

## 2017-06-16 DIAGNOSIS — Z85.038 HISTORY OF COLON CANCER: ICD-10-CM

## 2017-06-16 DIAGNOSIS — E11.59 HYPERTENSION ASSOCIATED WITH DIABETES: ICD-10-CM

## 2017-06-16 DIAGNOSIS — N18.30 CHRONIC KIDNEY DISEASE, STAGE III (MODERATE): ICD-10-CM

## 2017-06-16 DIAGNOSIS — E78.5 HYPERLIPIDEMIA ASSOCIATED WITH TYPE 2 DIABETES MELLITUS: ICD-10-CM

## 2017-06-16 DIAGNOSIS — N18.30 BENIGN HYPERTENSIVE HEART AND KIDNEY DISEASE WITH CHRONIC KIDNEY DISEASE, STAGE III: ICD-10-CM

## 2017-06-16 DIAGNOSIS — E11.22 TYPE 2 DIABETES MELLITUS WITH STAGE 3 CHRONIC KIDNEY DISEASE, WITHOUT LONG-TERM CURRENT USE OF INSULIN: ICD-10-CM

## 2017-06-16 DIAGNOSIS — I15.2 HYPERTENSION ASSOCIATED WITH DIABETES: ICD-10-CM

## 2017-06-16 DIAGNOSIS — N18.30 TYPE 2 DIABETES MELLITUS WITH STAGE 3 CHRONIC KIDNEY DISEASE, WITHOUT LONG-TERM CURRENT USE OF INSULIN: Primary | ICD-10-CM

## 2017-06-16 DIAGNOSIS — I13.10 BENIGN HYPERTENSIVE HEART AND KIDNEY DISEASE WITH CHRONIC KIDNEY DISEASE, STAGE III: ICD-10-CM

## 2017-06-16 DIAGNOSIS — N18.9 ANEMIA IN CHRONIC KIDNEY DISEASE(285.21): ICD-10-CM

## 2017-06-16 DIAGNOSIS — N18.30 TYPE 2 DIABETES MELLITUS WITH STAGE 3 CHRONIC KIDNEY DISEASE, WITHOUT LONG-TERM CURRENT USE OF INSULIN: ICD-10-CM

## 2017-06-16 DIAGNOSIS — E11.22 TYPE 2 DIABETES MELLITUS WITH STAGE 3 CHRONIC KIDNEY DISEASE, WITHOUT LONG-TERM CURRENT USE OF INSULIN: Primary | ICD-10-CM

## 2017-06-16 LAB
ANION GAP SERPL CALC-SCNC: 10 MMOL/L
BUN SERPL-MCNC: 42 MG/DL
CALCIUM SERPL-MCNC: 8.5 MG/DL
CHLORIDE SERPL-SCNC: 111 MMOL/L
CO2 SERPL-SCNC: 17 MMOL/L
CREAT SERPL-MCNC: 3 MG/DL
EST. GFR  (AFRICAN AMERICAN): 24.4 ML/MIN/1.73 M^2
EST. GFR  (NON AFRICAN AMERICAN): 21.1 ML/MIN/1.73 M^2
GLUCOSE SERPL-MCNC: 193 MG/DL
POTASSIUM SERPL-SCNC: 3.9 MMOL/L
SODIUM SERPL-SCNC: 138 MMOL/L

## 2017-06-16 PROCEDURE — 36415 COLL VENOUS BLD VENIPUNCTURE: CPT | Mod: PO

## 2017-06-16 PROCEDURE — 99214 OFFICE O/P EST MOD 30 MIN: CPT | Mod: S$GLB,,, | Performed by: INTERNAL MEDICINE

## 2017-06-16 PROCEDURE — 3045F PR MOST RECENT HEMOGLOBIN A1C LEVEL 7.0-9.0%: CPT | Mod: S$GLB,,, | Performed by: INTERNAL MEDICINE

## 2017-06-16 PROCEDURE — 80048 BASIC METABOLIC PNL TOTAL CA: CPT

## 2017-06-16 PROCEDURE — 99999 PR PBB SHADOW E&M-EST. PATIENT-LVL III: CPT | Mod: PBBFAC,,, | Performed by: INTERNAL MEDICINE

## 2017-06-16 RX ORDER — PROMETHAZINE HYDROCHLORIDE 25 MG/1
25 TABLET ORAL EVERY 6 HOURS PRN
Qty: 30 TABLET | Refills: 5 | Status: SHIPPED | OUTPATIENT
Start: 2017-06-16

## 2017-06-16 NOTE — PROGRESS NOTES
"HPI:  Patient is a 62-year-old man who comes today for follow-up his diabetes, hypertension, lipids.  Patient has no complaints at this time.  He states his sugars have been doing well.  He denies any hypoglycemic events.  His blood pressures been well-controlled.  There have been no other new problems.  He had his ureteral stents were placed within the last couple of weeks.  They lab work done below, was 1 week prior to that.  He states that his kidney function test always go up because those stents beginning to get occluded.  Once they get replaced The creatinine always goes down    Current meds have been verified and updated per the EMR  Exam:/78   Pulse 73   Temp 99.9 °F (37.7 °C) (Tympanic)   Ht 5' 6" (1.676 m)   Wt 96.1 kg (211 lb 13.8 oz)   SpO2 98%   BMI 34.20 kg/m²   Exam deferred    Lab Results   Component Value Date    WBC 6.99 05/26/2017    HGB 11.7 (L) 05/26/2017    HCT 36.2 (L) 05/26/2017     05/26/2017    CHOL 124 05/26/2017    TRIG 357 (H) 05/26/2017    HDL 33 (L) 05/26/2017    ALT 12 05/26/2017    AST 19 05/26/2017     05/26/2017    K 4.4 05/26/2017     05/26/2017    CREATININE 2.8 (H) 05/26/2017    BUN 35 (H) 05/26/2017    CO2 20 (L) 05/26/2017    TSH 2.289 05/26/2017    PSA 0.13 08/21/2014    HGBA1C 7.9 (H) 05/26/2017       Impression:  Hypertension and lipids to goal  Diabetes, not to goal  Chronic kidney disease, stage III, recent elevation in creatinine, most likely due to his retroperitoneal fibrosis  Patient Active Problem List   Diagnosis    Calculus of kidney    History of colon cancer    Retroperitoneal fibrosis    DM type 2 causing CKD stage 3    DM type 2 with diabetic mixed hyperlipidemia    Anemia in chronic kidney disease    Colostomy status    Hypertension associated with diabetes    DM (diabetes mellitus), type 2 with complications    Benign hypertensive heart and kidney disease with chronic kidney disease, stage III    Hyperlipidemia " associated with type 2 diabetes mellitus    Chronic kidney disease, stage III (moderate)    History of gout       Plan:  Orders Placed This Encounter    Hemoglobin A1c    Comprehensive metabolic panel    Lipid panel    Basic metabolic panel    canagliflozin (INVOKANA) 100 mg Tab    promethazine (PHENERGAN) 25 MG tablet     He will have a BMP done today to recheck his creatinine.  He'll be seen again in 3 months with the above lab work.  He was started on Invokana.

## 2017-06-17 ENCOUNTER — TELEPHONE (OUTPATIENT)
Dept: INTERNAL MEDICINE | Facility: CLINIC | Age: 62
End: 2017-06-17

## 2017-06-17 NOTE — TELEPHONE ENCOUNTER
Call pt and tell him that his kidney function has detoriated even more than the previous lab. He needs to see his Urologist ASAP to make sure his ureteral stents are open and working. He should not take the new diabetes medication that I gave him on Friday due to his level of kidney function. He should not take the Invokana at this time.

## 2017-06-19 ENCOUNTER — TELEPHONE (OUTPATIENT)
Dept: INTERNAL MEDICINE | Facility: CLINIC | Age: 62
End: 2017-06-19

## 2017-06-19 NOTE — TELEPHONE ENCOUNTER
S/w pt. Advised as listed. He agreed to hang up with me and call his urologist, Dr. Jimi So and get an appt to see him. He will return call to confirm that he has an appt with Dr. So. Faxed labs to Dr. So's office./TGD

## 2017-06-19 NOTE — TELEPHONE ENCOUNTER
----- Message from Julio Arizmendi sent at 6/19/2017  4:24 PM CDT -----  Contact: Tanika ( Dr Swapna So office )  Tanika ( Dr Swapna So office ) is requesting some lab results on the pt.          Please call Tanika ( Dr Swapna So office ) back at [phone] (Office ) or [phone] ( fax )

## 2017-06-19 NOTE — TELEPHONE ENCOUNTER
Called pt left message of results and recommendations.  To call office if any question and to schedule follow up appointment.

## 2017-06-20 ENCOUNTER — TELEPHONE (OUTPATIENT)
Dept: INTERNAL MEDICINE | Facility: CLINIC | Age: 62
End: 2017-06-20

## 2017-06-20 NOTE — TELEPHONE ENCOUNTER
----- Message from Anitra Machado sent at 6/20/2017 12:50 PM CDT -----  Dr So office with [facility] at [phone]//Press 0//states she is calling to speak with Dr Rouse regarding pt appt yesterday//please call asap////thanks/porfirio

## 2017-06-20 NOTE — TELEPHONE ENCOUNTER
S/w Tanika w/ Dr. So's office. Labs received. She wanted to be clear on why Dr. Rouse referred pt to Dr. Judah gonzalez. I advised, per Dr. Rouse, to see urologist asap to be sure his stents are open and working. Verbalized understanding/TGD

## 2017-07-18 ENCOUNTER — LAB VISIT (OUTPATIENT)
Dept: LAB | Facility: HOSPITAL | Age: 62
End: 2017-07-18
Attending: INTERNAL MEDICINE
Payer: MEDICARE

## 2017-07-18 DIAGNOSIS — M1A.09X0 IDIOPATHIC CHRONIC GOUT OF MULTIPLE SITES WITHOUT TOPHUS: Primary | ICD-10-CM

## 2017-07-18 DIAGNOSIS — M19.90 LOCALIZED OSTEOARTHROSIS NOT SPECIFIED WHETHER PRIMARY OR SECONDARY, UNSPECIFIED SITE: ICD-10-CM

## 2017-07-18 DIAGNOSIS — Z51.81 ENCOUNTER FOR THERAPEUTIC DRUG MONITORING: ICD-10-CM

## 2017-07-18 DIAGNOSIS — Z15.89 DEFICIENCY OF DNA REPAIR: ICD-10-CM

## 2017-07-18 DIAGNOSIS — N18.30 CHRONIC KIDNEY DISEASE, STAGE III (MODERATE): ICD-10-CM

## 2017-07-18 LAB
ALBUMIN SERPL BCP-MCNC: 3.1 G/DL
ALP SERPL-CCNC: 31 U/L
ALT SERPL W/O P-5'-P-CCNC: 10 U/L
ANION GAP SERPL CALC-SCNC: 12 MMOL/L
AST SERPL-CCNC: 17 U/L
BASOPHILS # BLD AUTO: 0.03 K/UL
BASOPHILS NFR BLD: 0.4 %
BILIRUB SERPL-MCNC: 0.3 MG/DL
BUN SERPL-MCNC: 32 MG/DL
CALCIUM SERPL-MCNC: 9.1 MG/DL
CHLORIDE SERPL-SCNC: 102 MMOL/L
CO2 SERPL-SCNC: 24 MMOL/L
CREAT SERPL-MCNC: 2.1 MG/DL
CRP SERPL-MCNC: 13.6 MG/L
DIFFERENTIAL METHOD: ABNORMAL
EOSINOPHIL # BLD AUTO: 0.5 K/UL
EOSINOPHIL NFR BLD: 6.1 %
ERYTHROCYTE [DISTWIDTH] IN BLOOD BY AUTOMATED COUNT: 15.4 %
ERYTHROCYTE [SEDIMENTATION RATE] IN BLOOD BY WESTERGREN METHOD: 17 MM/HR
EST. GFR  (AFRICAN AMERICAN): 37.6 ML/MIN/1.73 M^2
EST. GFR  (NON AFRICAN AMERICAN): 32.5 ML/MIN/1.73 M^2
GLUCOSE SERPL-MCNC: 163 MG/DL
HCT VFR BLD AUTO: 33.7 %
HGB BLD-MCNC: 10.9 G/DL
LYMPHOCYTES # BLD AUTO: 2.2 K/UL
LYMPHOCYTES NFR BLD: 28.6 %
MCH RBC QN AUTO: 28.2 PG
MCHC RBC AUTO-ENTMCNC: 32.3 %
MCV RBC AUTO: 87 FL
MONOCYTES # BLD AUTO: 0.6 K/UL
MONOCYTES NFR BLD: 7.2 %
NEUTROPHILS # BLD AUTO: 4.4 K/UL
NEUTROPHILS NFR BLD: 57 %
PLATELET # BLD AUTO: 311 K/UL
PMV BLD AUTO: 9.9 FL
POTASSIUM SERPL-SCNC: 3.9 MMOL/L
PROT SERPL-MCNC: 6.4 G/DL
RBC # BLD AUTO: 3.87 M/UL
SODIUM SERPL-SCNC: 138 MMOL/L
URATE SERPL-MCNC: 3.4 MG/DL
WBC # BLD AUTO: 7.65 K/UL

## 2017-07-18 PROCEDURE — 85025 COMPLETE CBC W/AUTO DIFF WBC: CPT

## 2017-07-18 PROCEDURE — 86140 C-REACTIVE PROTEIN: CPT

## 2017-07-18 PROCEDURE — 85651 RBC SED RATE NONAUTOMATED: CPT

## 2017-07-18 PROCEDURE — 84550 ASSAY OF BLOOD/URIC ACID: CPT

## 2017-07-18 PROCEDURE — 80053 COMPREHEN METABOLIC PANEL: CPT

## 2017-07-18 PROCEDURE — 36415 COLL VENOUS BLD VENIPUNCTURE: CPT | Mod: PO

## 2017-08-07 ENCOUNTER — OFFICE VISIT (OUTPATIENT)
Dept: INTERNAL MEDICINE | Facility: CLINIC | Age: 62
End: 2017-08-07
Payer: MEDICARE

## 2017-08-07 VITALS
WEIGHT: 221.56 LBS | HEIGHT: 66 IN | BODY MASS INDEX: 35.61 KG/M2 | TEMPERATURE: 100 F | SYSTOLIC BLOOD PRESSURE: 136 MMHG | HEART RATE: 85 BPM | OXYGEN SATURATION: 97 % | DIASTOLIC BLOOD PRESSURE: 86 MMHG

## 2017-08-07 DIAGNOSIS — R10.9 FLANK PAIN: Primary | ICD-10-CM

## 2017-08-07 PROCEDURE — 3008F BODY MASS INDEX DOCD: CPT | Mod: S$GLB,,, | Performed by: NURSE PRACTITIONER

## 2017-08-07 PROCEDURE — 87186 SC STD MICRODIL/AGAR DIL: CPT

## 2017-08-07 PROCEDURE — 99213 OFFICE O/P EST LOW 20 MIN: CPT | Mod: S$GLB,,, | Performed by: NURSE PRACTITIONER

## 2017-08-07 PROCEDURE — 3075F SYST BP GE 130 - 139MM HG: CPT | Mod: S$GLB,,, | Performed by: NURSE PRACTITIONER

## 2017-08-07 PROCEDURE — 87077 CULTURE AEROBIC IDENTIFY: CPT

## 2017-08-07 PROCEDURE — 87086 URINE CULTURE/COLONY COUNT: CPT

## 2017-08-07 PROCEDURE — 99999 PR PBB SHADOW E&M-EST. PATIENT-LVL V: CPT | Mod: PBBFAC,,, | Performed by: NURSE PRACTITIONER

## 2017-08-07 PROCEDURE — 3079F DIAST BP 80-89 MM HG: CPT | Mod: S$GLB,,, | Performed by: NURSE PRACTITIONER

## 2017-08-07 PROCEDURE — 87088 URINE BACTERIA CULTURE: CPT

## 2017-08-07 RX ORDER — SULFAMETHOXAZOLE AND TRIMETHOPRIM 800; 160 MG/1; MG/1
1 TABLET ORAL 2 TIMES DAILY
Qty: 20 TABLET | Refills: 0 | Status: SHIPPED | OUTPATIENT
Start: 2017-08-07 | End: 2017-08-10

## 2017-08-07 NOTE — PROGRESS NOTES
"Subjective:      Patient ID: Rickey Amaya Jr. is a 62 y.o. male.    Chief Complaint: Follow-up (kidney infection)    HPI:  Patient sees Dr So, urologist for problems with scar tissue, says he has stents in place due to hydronephrosis to help with drainage. Bilaterally he has drainage tubes to a collection bag.  He says he thinks he may have them removed on the 17th.  He is having mild right flank pain, thinks he may have a kidney infection.  No fever.       Past Medical History:   Diagnosis Date    Anemia in chr kidney dis     Benign hypertensive heart and kidney disease with chronic kidney disease, stage III     Calculus of kidney     Chronic kidney disease, stage III (moderate)     Colostomy status     DM (diabetes mellitus), type 2 with complications     DM type 2 causing CKD stage 3     DM type 2 with diabetic mixed hyperlipidemia     History of colon cancer 2005    History of gout     Hyperlipidemia associated with type 2 diabetes mellitus     Hypertension associated with diabetes     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       Past Surgical History:   Procedure Laterality Date    s/p exp lap times three      complications of colon cancer surgery    S/P partial coloectomy      with colostomy d/t colon cancer       Lab Results   Component Value Date    WBC 7.65 07/18/2017    HGB 10.9 (L) 07/18/2017    HCT 33.7 (L) 07/18/2017     07/18/2017    CHOL 124 05/26/2017    TRIG 357 (H) 05/26/2017    HDL 33 (L) 05/26/2017    ALT 10 07/18/2017    AST 17 07/18/2017     07/18/2017    K 3.9 07/18/2017     07/18/2017    CREATININE 2.1 (H) 07/18/2017    BUN 32 (H) 07/18/2017    CO2 24 07/18/2017    TSH 2.289 05/26/2017    PSA 0.13 08/21/2014    HGBA1C 7.9 (H) 05/26/2017       /86 (BP Location: Right arm, Patient Position: Sitting, BP Method: Manual)   Pulse 85   Temp 100.1 °F (37.8 °C) (Tympanic)   Ht 5' 6" (1.676 m)   Wt 100.5 kg (221 lb 9 oz)   SpO2 97%   BMI 35.76 kg/m² "       Review of Systems   Constitutional: Negative for appetite change, chills, diaphoresis and fever.   HENT: Negative for congestion, ear pain, postnasal drip, rhinorrhea, sneezing, sore throat and trouble swallowing.    Eyes: Negative for photophobia, pain and visual disturbance.   Respiratory: Negative for apnea, cough, choking, chest tightness, shortness of breath and wheezing.    Cardiovascular: Negative for chest pain, palpitations and leg swelling.   Gastrointestinal: Negative for abdominal pain, constipation, diarrhea, nausea and vomiting.   Genitourinary: Positive for flank pain. Negative for decreased urine volume, difficulty urinating, dysuria, hematuria and urgency.   Musculoskeletal: Negative for arthralgias, gait problem, joint swelling and myalgias.   Skin: Negative for rash.   Neurological: Negative for dizziness, tremors, seizures, syncope, weakness, light-headedness, numbness and headaches.   Psychiatric/Behavioral: Negative for agitation, confusion, decreased concentration, hallucinations and sleep disturbance. The patient is not nervous/anxious.       Objective:     Physical Exam   Constitutional: He is oriented to person, place, and time. He appears well-developed and well-nourished. No distress.   Genitourinary:   Genitourinary Comments: Bilateral urine collection bags have light yellow urine, with sediment, bilateral dressings changed, mild redness/irritation to the right insertion site, tripple antibiotic ointment applied   Musculoskeletal:   Normal gait   Neurological: He is alert and oriented to person, place, and time.   Skin: Skin is warm and dry.   Psychiatric: He has a normal mood and affect. His behavior is normal.     Assessment:      1. Flank pain      Plan:   Flank pain  -     POCT urine dipstick without microscope  -     Urine culture    Other orders  -     sulfamethoxazole-trimethoprim 800-160mg (BACTRIM DS) 800-160 mg Tab; Take 1 tablet by mouth 2 (two) times daily.  Dispense:  20 tablet; Refill: 0    called dr So's office, notified of the urine dip, will start the above antibiotic, monitor for the culture results.  Follow up with Dr So for any further problems prior to scheduled appt or fever      Current Outpatient Prescriptions:     allopurinol (ZYLOPRIM) 300 MG tablet, , Disp: , Rfl:     aspirin 81 mg Tab, Take by mouth. 1 Tablet Oral Every day, Disp: , Rfl:     blood sugar diagnostic (TRUETEST TEST STRIPS) Strp, 1 strip by Misc.(Non-Drug; Combo Route) route 2 (two) times daily., Disp: 200 strip, Rfl: 3    blood-glucose meter kit, True Result Glucometer. Use as instructed, Disp: 1 each, Rfl: 0    cinnamon bark (CINNAMON) 500 mg capsule, Take 3,000 mg by mouth., Disp: , Rfl:     fenofibrate 160 MG Tab, TAKE ONE TABLET BY MOUTH ONCE DAILY EVERY MORNING, Disp: 30 tablet, Rfl: 11    ferrous sulfate 134 mg (27 mg iron) Tab, Take 1 tablet by mouth 2 (two) times daily., Disp: , Rfl:     glimepiride (AMARYL) 4 MG tablet, Take 1 tablet (4 mg total) by mouth 2 (two) times daily., Disp: 60 tablet, Rfl: 11    hydrochlorothiazide (HYDRODIURIL) 25 MG tablet, Take 1 tablet (25 mg total) by mouth once daily. 1 Tablet Oral Every day, Disp: 30 tablet, Rfl: 11    hydrOXYzine pamoate (VISTARIL) 25 MG Cap, Take 1 capsule (25 mg total) by mouth every 8 (eight) hours as needed., Disp: 90 capsule, Rfl: 11    hyoscyamine (LEVSIN/SL) 0.125 mg Subl, , Disp: , Rfl:     metoprolol succinate (TOPROL-XL) 100 MG 24 hr tablet, TAKE ONE TABLET BY MOUTH ONCE DAILY, Disp: 90 tablet, Rfl: 3    multivitamin (THERAGRAN) per tablet, Take by mouth. 1 Tablet Oral Every day, Disp: , Rfl:     MULTIVITAMIN ORAL, Take by mouth., Disp: , Rfl:     nystatin-triamcinolone (MYCOLOG II) cream, , Disp: , Rfl:     omega 3-dha-epa-fish oil (OMEGA 3 FISH OIL) 900-1,400 mg CpDR, Take 2 capsules by mouth 2 (two) times daily., Disp: , Rfl:     omega-3 fatty acids-vitamin E (FISH OIL) 1,000 mg Cap, Take by mouth. 1 Capsule  Oral Twice a day, Disp: , Rfl:     oxybutynin (DITROPAN-XL) 10 MG 24 hr tablet, , Disp: , Rfl:     promethazine (PHENERGAN) 25 MG tablet, Take 1 tablet (25 mg total) by mouth every 6 (six) hours as needed for Nausea., Disp: 30 tablet, Rfl: 5    simvastatin (ZOCOR) 40 MG tablet, Take 1 tablet (40 mg total) by mouth every evening., Disp: 90 tablet, Rfl: 3    sulfamethoxazole-trimethoprim 800-160mg (BACTRIM DS) 800-160 mg Tab, Take 1 tablet by mouth 2 (two) times daily., Disp: 20 tablet, Rfl: 0    tamoxifen (NOLVADEX) 20 MG Tab, Take 20 mg by mouth 2 (two) times daily., Disp: , Rfl:     tamsulosin (FLOMAX) 0.4 mg Cp24, Take by mouth. 1 Capsule, Ext Release 24 hr Oral At bedtime, Disp: , Rfl:     TIZANIDINE HCL (TIZANIDINE ORAL), Take by mouth. 1 Capsule Oral Every day, Disp: , Rfl:     mycophenolate (CELLCEPT) 500 mg Tab, Take 3 tablets (1,500 mg total) by mouth 2 (two) times daily. (Patient taking differently: Take 2,000 mg by mouth 2 (two) times daily. 2 tablets in AM and 2 tablets in PM), Disp: 180 tablet, Rfl: 11    sulfamethoxazole-trimethoprim 800-160mg (BACTRIM DS) 800-160 mg Tab, Take 1 tablet by mouth 2 (two) times daily., Disp: 20 tablet, Rfl: 0    triamcinolone acetonide 0.025% (KENALOG) 0.025 % cream, Apply topically 2 (two) times daily., Disp: 15 g, Rfl: 0

## 2017-08-10 ENCOUNTER — TELEPHONE (OUTPATIENT)
Dept: INTERNAL MEDICINE | Facility: CLINIC | Age: 62
End: 2017-08-10

## 2017-08-10 LAB — BACTERIA UR CULT: NORMAL

## 2017-08-10 RX ORDER — DOXYCYCLINE HYCLATE 100 MG
100 TABLET ORAL 2 TIMES DAILY
Qty: 20 TABLET | Refills: 0 | Status: SHIPPED | OUTPATIENT
Start: 2017-08-10 | End: 2017-12-22 | Stop reason: ALTCHOICE

## 2017-08-10 NOTE — TELEPHONE ENCOUNTER
Called and notified of the need to change antibiotics due to the culture results.   Will keep appt with dr So next week

## 2017-09-11 ENCOUNTER — LAB VISIT (OUTPATIENT)
Dept: LAB | Facility: HOSPITAL | Age: 62
End: 2017-09-11
Attending: INTERNAL MEDICINE
Payer: MEDICARE

## 2017-09-11 ENCOUNTER — PATIENT OUTREACH (OUTPATIENT)
Dept: ADMINISTRATIVE | Facility: HOSPITAL | Age: 62
End: 2017-09-11

## 2017-09-11 DIAGNOSIS — E11.69 HYPERLIPIDEMIA ASSOCIATED WITH TYPE 2 DIABETES MELLITUS: ICD-10-CM

## 2017-09-11 DIAGNOSIS — E11.22 TYPE 2 DIABETES MELLITUS WITH STAGE 3 CHRONIC KIDNEY DISEASE, WITHOUT LONG-TERM CURRENT USE OF INSULIN: ICD-10-CM

## 2017-09-11 DIAGNOSIS — N18.30 TYPE 2 DIABETES MELLITUS WITH STAGE 3 CHRONIC KIDNEY DISEASE, WITHOUT LONG-TERM CURRENT USE OF INSULIN: ICD-10-CM

## 2017-09-11 DIAGNOSIS — E11.8 TYPE 2 DIABETES MELLITUS WITH COMPLICATION, WITHOUT LONG-TERM CURRENT USE OF INSULIN: ICD-10-CM

## 2017-09-11 DIAGNOSIS — E78.5 HYPERLIPIDEMIA ASSOCIATED WITH TYPE 2 DIABETES MELLITUS: ICD-10-CM

## 2017-09-11 DIAGNOSIS — E11.8 TYPE 2 DIABETES MELLITUS WITH COMPLICATION, WITHOUT LONG-TERM CURRENT USE OF INSULIN: Primary | ICD-10-CM

## 2017-09-11 LAB
ALBUMIN SERPL BCP-MCNC: 3.3 G/DL
ALP SERPL-CCNC: 52 U/L
ALT SERPL W/O P-5'-P-CCNC: 13 U/L
ANION GAP SERPL CALC-SCNC: 10 MMOL/L
AST SERPL-CCNC: 17 U/L
BILIRUB SERPL-MCNC: 0.4 MG/DL
BUN SERPL-MCNC: 28 MG/DL
CALCIUM SERPL-MCNC: 9.4 MG/DL
CHLORIDE SERPL-SCNC: 103 MMOL/L
CO2 SERPL-SCNC: 21 MMOL/L
CREAT SERPL-MCNC: 2.2 MG/DL
EST. GFR  (AFRICAN AMERICAN): 35.5 ML/MIN/1.73 M^2
EST. GFR  (NON AFRICAN AMERICAN): 30.7 ML/MIN/1.73 M^2
GLUCOSE SERPL-MCNC: 260 MG/DL
POTASSIUM SERPL-SCNC: 4.8 MMOL/L
PROT SERPL-MCNC: 7.1 G/DL
SODIUM SERPL-SCNC: 134 MMOL/L

## 2017-09-11 PROCEDURE — 80053 COMPREHEN METABOLIC PANEL: CPT

## 2017-09-11 PROCEDURE — 83036 HEMOGLOBIN GLYCOSYLATED A1C: CPT

## 2017-09-11 PROCEDURE — 36415 COLL VENOUS BLD VENIPUNCTURE: CPT | Mod: PO

## 2017-09-11 PROCEDURE — 80061 LIPID PANEL: CPT

## 2017-09-11 NOTE — LETTER
September 11, 2017    [facility]  [address], [place] [address]               [facility]  [address]  [facility] [address]  Phone: [phone] September 11, 2017     To whom it may concern     We are seeing ASHLEY Ferrell Jr..O.B is 1955, at [facility]. Octavio Rouse MD is their primary care physician. To help with our fadia maintenance records could you please send the following:     Most recent copy of Diabetic Eye Exam that states Positive or Negative Retinopathy.          Thank-you in advance for your assistance. If you have any questions or concerns, please don't hesitate to contact me at [phone].         Sincerely,  Fadumo SANDOVAL LPN Care Coordination   [facility]  Phone [phone] ext [phone],  Fax [phone]

## 2017-09-12 LAB
CHOLEST SERPL-MCNC: 160 MG/DL
CHOLEST/HDLC SERPL: 5.3 {RATIO}
ESTIMATED AVG GLUCOSE: 209 MG/DL
HBA1C MFR BLD HPLC: 8.9 %
HDLC SERPL-MCNC: 30 MG/DL
HDLC SERPL: 18.8 %
LDLC SERPL CALC-MCNC: ABNORMAL MG/DL
NONHDLC SERPL-MCNC: 130 MG/DL
TRIGL SERPL-MCNC: 606 MG/DL

## 2017-09-20 ENCOUNTER — LAB VISIT (OUTPATIENT)
Dept: LAB | Facility: HOSPITAL | Age: 62
End: 2017-09-20
Attending: INTERNAL MEDICINE
Payer: MEDICARE

## 2017-09-20 DIAGNOSIS — Z15.89 DEFICIENCY OF DNA REPAIR: ICD-10-CM

## 2017-09-20 DIAGNOSIS — M1A.09X0 IDIOPATHIC CHRONIC GOUT OF MULTIPLE SITES WITHOUT TOPHUS: ICD-10-CM

## 2017-09-20 DIAGNOSIS — N18.30 CHRONIC KIDNEY DISEASE, STAGE III (MODERATE): ICD-10-CM

## 2017-09-20 DIAGNOSIS — Z51.81 ENCOUNTER FOR THERAPEUTIC DRUG MONITORING: Primary | ICD-10-CM

## 2017-09-20 LAB
ALBUMIN SERPL BCP-MCNC: 2.8 G/DL
ALP SERPL-CCNC: 41 U/L
ALT SERPL W/O P-5'-P-CCNC: 13 U/L
ANION GAP SERPL CALC-SCNC: 8 MMOL/L
AST SERPL-CCNC: 16 U/L
BASOPHILS # BLD AUTO: 0.03 K/UL
BASOPHILS NFR BLD: 0.5 %
BILIRUB SERPL-MCNC: 0.4 MG/DL
BUN SERPL-MCNC: 25 MG/DL
CALCIUM SERPL-MCNC: 8.7 MG/DL
CHLORIDE SERPL-SCNC: 103 MMOL/L
CO2 SERPL-SCNC: 26 MMOL/L
CREAT SERPL-MCNC: 1.8 MG/DL
CRP SERPL-MCNC: 34.3 MG/L
DIFFERENTIAL METHOD: ABNORMAL
EOSINOPHIL # BLD AUTO: 0.3 K/UL
EOSINOPHIL NFR BLD: 4.8 %
ERYTHROCYTE [DISTWIDTH] IN BLOOD BY AUTOMATED COUNT: 14.9 %
ERYTHROCYTE [SEDIMENTATION RATE] IN BLOOD BY WESTERGREN METHOD: 20 MM/HR
EST. GFR  (AFRICAN AMERICAN): 45.3 ML/MIN/1.73 M^2
EST. GFR  (NON AFRICAN AMERICAN): 39.2 ML/MIN/1.73 M^2
GLUCOSE SERPL-MCNC: 220 MG/DL
HCT VFR BLD AUTO: 30.7 %
HGB BLD-MCNC: 10.2 G/DL
LYMPHOCYTES # BLD AUTO: 1.5 K/UL
LYMPHOCYTES NFR BLD: 22.8 %
MCH RBC QN AUTO: 28.5 PG
MCHC RBC AUTO-ENTMCNC: 33.2 G/DL
MCV RBC AUTO: 86 FL
MONOCYTES # BLD AUTO: 0.7 K/UL
MONOCYTES NFR BLD: 10.9 %
NEUTROPHILS # BLD AUTO: 3.9 K/UL
NEUTROPHILS NFR BLD: 60.7 %
PLATELET # BLD AUTO: 219 K/UL
PMV BLD AUTO: 9.7 FL
POTASSIUM SERPL-SCNC: 4.4 MMOL/L
PROT SERPL-MCNC: 5.9 G/DL
RBC # BLD AUTO: 3.58 M/UL
SODIUM SERPL-SCNC: 137 MMOL/L
WBC # BLD AUTO: 6.41 K/UL

## 2017-09-20 PROCEDURE — 36415 COLL VENOUS BLD VENIPUNCTURE: CPT | Mod: PO

## 2017-09-20 PROCEDURE — 86140 C-REACTIVE PROTEIN: CPT

## 2017-09-20 PROCEDURE — 85651 RBC SED RATE NONAUTOMATED: CPT

## 2017-09-20 PROCEDURE — 85025 COMPLETE CBC W/AUTO DIFF WBC: CPT

## 2017-09-20 PROCEDURE — 80053 COMPREHEN METABOLIC PANEL: CPT

## 2017-10-30 ENCOUNTER — LAB VISIT (OUTPATIENT)
Dept: LAB | Facility: HOSPITAL | Age: 62
End: 2017-10-30
Attending: INTERNAL MEDICINE
Payer: MEDICARE

## 2017-10-30 ENCOUNTER — TELEPHONE (OUTPATIENT)
Dept: INTERNAL MEDICINE | Facility: CLINIC | Age: 62
End: 2017-10-30

## 2017-10-30 DIAGNOSIS — D84.9 IMMUNOSUPPRESSED STATUS: ICD-10-CM

## 2017-10-30 DIAGNOSIS — K68.2 RETROPERITONEAL FIBROSIS: Primary | ICD-10-CM

## 2017-10-30 DIAGNOSIS — Z51.81 MEDICATION MONITORING ENCOUNTER: ICD-10-CM

## 2017-10-30 PROCEDURE — 36415 COLL VENOUS BLD VENIPUNCTURE: CPT | Mod: PO

## 2017-10-30 PROCEDURE — 86480 TB TEST CELL IMMUN MEASURE: CPT

## 2017-10-30 RX ORDER — TIZANIDINE 2 MG/1
2 TABLET ORAL
Qty: 360 TABLET | Refills: 0 | Status: SHIPPED | OUTPATIENT
Start: 2017-10-30 | End: 2019-09-19

## 2017-10-30 NOTE — TELEPHONE ENCOUNTER
----- Message from Monserrat Da Silva sent at 10/30/2017 11:20 AM CDT -----  Please call [facility] at [phone] about verification on prescription.

## 2017-10-31 ENCOUNTER — TELEPHONE (OUTPATIENT)
Dept: INTERNAL MEDICINE | Facility: CLINIC | Age: 62
End: 2017-10-31

## 2017-10-31 NOTE — TELEPHONE ENCOUNTER
----- Message from Graciela Hernandez sent at 10/31/2017  8:42 AM CDT -----  Contact: [facility]  Please call pharmacy @ [phone] regarding tizanidine.

## 2017-10-31 NOTE — TELEPHONE ENCOUNTER
Spoke with pharmacy. Sig on rx for patient's Zanaflex was conflicting. Sig states that 1 PO six times daily but then stated take 1 PO daily behind it. I corrected sig on med. Pharmacy is now going to fill med for patient.

## 2017-11-01 LAB
MITOGEN NIL: 3.41 IU/ML
NIL: 0.05 IU/ML
TB ANTIGEN NIL: 0 IU/ML
TB ANTIGEN: 0.05 IU/ML
TB GOLD: NEGATIVE

## 2017-11-13 DIAGNOSIS — E11.22 TYPE 2 DIABETES MELLITUS WITH STAGE 3 CHRONIC KIDNEY DISEASE, WITHOUT LONG-TERM CURRENT USE OF INSULIN: Primary | ICD-10-CM

## 2017-11-13 DIAGNOSIS — N18.30 TYPE 2 DIABETES MELLITUS WITH STAGE 3 CHRONIC KIDNEY DISEASE, WITHOUT LONG-TERM CURRENT USE OF INSULIN: Primary | ICD-10-CM

## 2017-11-13 RX ORDER — METOPROLOL SUCCINATE 100 MG/1
TABLET, EXTENDED RELEASE ORAL
Qty: 30 TABLET | Refills: 0 | Status: SHIPPED | OUTPATIENT
Start: 2017-11-13 | End: 2017-12-26 | Stop reason: SDUPTHER

## 2017-11-20 ENCOUNTER — LAB VISIT (OUTPATIENT)
Dept: LAB | Facility: HOSPITAL | Age: 62
End: 2017-11-20
Attending: INTERNAL MEDICINE
Payer: MEDICARE

## 2017-11-20 DIAGNOSIS — N18.30 TYPE 2 DIABETES MELLITUS WITH STAGE 3 CHRONIC KIDNEY DISEASE, WITHOUT LONG-TERM CURRENT USE OF INSULIN: ICD-10-CM

## 2017-11-20 DIAGNOSIS — E11.22 TYPE 2 DIABETES MELLITUS WITH STAGE 3 CHRONIC KIDNEY DISEASE, WITHOUT LONG-TERM CURRENT USE OF INSULIN: ICD-10-CM

## 2017-11-20 LAB
ALBUMIN SERPL BCP-MCNC: 3.7 G/DL
ALP SERPL-CCNC: 41 U/L
ALT SERPL W/O P-5'-P-CCNC: 17 U/L
ANION GAP SERPL CALC-SCNC: 10 MMOL/L
AST SERPL-CCNC: 22 U/L
BASOPHILS # BLD AUTO: 0.08 K/UL
BASOPHILS NFR BLD: 1.5 %
BILIRUB SERPL-MCNC: 0.4 MG/DL
BUN SERPL-MCNC: 35 MG/DL
CALCIUM SERPL-MCNC: 9.8 MG/DL
CHLORIDE SERPL-SCNC: 104 MMOL/L
CHOLEST SERPL-MCNC: 162 MG/DL
CHOLEST/HDLC SERPL: 4 {RATIO}
CO2 SERPL-SCNC: 22 MMOL/L
CREAT SERPL-MCNC: 2.4 MG/DL
DIFFERENTIAL METHOD: ABNORMAL
EOSINOPHIL # BLD AUTO: 0.4 K/UL
EOSINOPHIL NFR BLD: 6.9 %
ERYTHROCYTE [DISTWIDTH] IN BLOOD BY AUTOMATED COUNT: 14.3 %
EST. GFR  (AFRICAN AMERICAN): 32 ML/MIN/1.73 M^2
EST. GFR  (NON AFRICAN AMERICAN): 27.7 ML/MIN/1.73 M^2
ESTIMATED AVG GLUCOSE: 209 MG/DL
GIANT PLATELETS BLD QL SMEAR: PRESENT
GLUCOSE SERPL-MCNC: 231 MG/DL
HBA1C MFR BLD HPLC: 8.9 %
HCT VFR BLD AUTO: 35.2 %
HDLC SERPL-MCNC: 41 MG/DL
HDLC SERPL: 25.3 %
HGB BLD-MCNC: 11.5 G/DL
IMM GRANULOCYTES # BLD AUTO: 0.02 K/UL
IMM GRANULOCYTES NFR BLD AUTO: 0.4 %
LDLC SERPL CALC-MCNC: 44.4 MG/DL
LYMPHOCYTES # BLD AUTO: 1.7 K/UL
LYMPHOCYTES NFR BLD: 30.8 %
MCH RBC QN AUTO: 28.5 PG
MCHC RBC AUTO-ENTMCNC: 32.7 G/DL
MCV RBC AUTO: 87 FL
MONOCYTES # BLD AUTO: 0.4 K/UL
MONOCYTES NFR BLD: 7.8 %
NEUTROPHILS # BLD AUTO: 2.8 K/UL
NEUTROPHILS NFR BLD: 52.6 %
NONHDLC SERPL-MCNC: 121 MG/DL
NRBC BLD-RTO: 0 /100 WBC
PLATELET # BLD AUTO: 232 K/UL
PLATELET BLD QL SMEAR: ABNORMAL
PMV BLD AUTO: 11.4 FL
POTASSIUM SERPL-SCNC: 4.6 MMOL/L
PROT SERPL-MCNC: 6.6 G/DL
RBC # BLD AUTO: 4.03 M/UL
SODIUM SERPL-SCNC: 136 MMOL/L
TRIGL SERPL-MCNC: 383 MG/DL
TSH SERPL DL<=0.005 MIU/L-ACNC: 1.95 UIU/ML
WBC # BLD AUTO: 5.36 K/UL

## 2017-11-20 PROCEDURE — 83036 HEMOGLOBIN GLYCOSYLATED A1C: CPT

## 2017-11-20 PROCEDURE — 85025 COMPLETE CBC W/AUTO DIFF WBC: CPT

## 2017-11-20 PROCEDURE — 84443 ASSAY THYROID STIM HORMONE: CPT

## 2017-11-20 PROCEDURE — 80061 LIPID PANEL: CPT

## 2017-11-20 PROCEDURE — 80053 COMPREHEN METABOLIC PANEL: CPT

## 2017-11-20 PROCEDURE — 36415 COLL VENOUS BLD VENIPUNCTURE: CPT | Mod: PO

## 2017-11-27 ENCOUNTER — OFFICE VISIT (OUTPATIENT)
Dept: INTERNAL MEDICINE | Facility: CLINIC | Age: 62
End: 2017-11-27
Payer: MEDICARE

## 2017-11-27 VITALS
HEIGHT: 66 IN | TEMPERATURE: 98 F | WEIGHT: 225.31 LBS | SYSTOLIC BLOOD PRESSURE: 126 MMHG | DIASTOLIC BLOOD PRESSURE: 82 MMHG | HEART RATE: 86 BPM | BODY MASS INDEX: 36.21 KG/M2

## 2017-11-27 DIAGNOSIS — E11.69 DM TYPE 2 WITH DIABETIC MIXED HYPERLIPIDEMIA: ICD-10-CM

## 2017-11-27 DIAGNOSIS — Z93.3 COLOSTOMY STATUS: ICD-10-CM

## 2017-11-27 DIAGNOSIS — N18.30 ANEMIA IN STAGE 3 CHRONIC KIDNEY DISEASE: ICD-10-CM

## 2017-11-27 DIAGNOSIS — K68.2 RETROPERITONEAL FIBROSIS: ICD-10-CM

## 2017-11-27 DIAGNOSIS — I13.10 BENIGN HYPERTENSIVE HEART AND KIDNEY DISEASE WITH CHRONIC KIDNEY DISEASE, STAGE III: ICD-10-CM

## 2017-11-27 DIAGNOSIS — D63.1 ANEMIA IN STAGE 3 CHRONIC KIDNEY DISEASE: ICD-10-CM

## 2017-11-27 DIAGNOSIS — Z87.39 HISTORY OF GOUT: ICD-10-CM

## 2017-11-27 DIAGNOSIS — N18.30 BENIGN HYPERTENSIVE HEART AND KIDNEY DISEASE WITH CHRONIC KIDNEY DISEASE, STAGE III: ICD-10-CM

## 2017-11-27 DIAGNOSIS — Z00.00 ROUTINE GENERAL MEDICAL EXAMINATION AT A HEALTH CARE FACILITY: Primary | ICD-10-CM

## 2017-11-27 DIAGNOSIS — E11.8 TYPE 2 DIABETES MELLITUS WITH COMPLICATION, WITHOUT LONG-TERM CURRENT USE OF INSULIN: ICD-10-CM

## 2017-11-27 DIAGNOSIS — I15.2 HYPERTENSION ASSOCIATED WITH DIABETES: ICD-10-CM

## 2017-11-27 DIAGNOSIS — N18.30 CHRONIC KIDNEY DISEASE, STAGE III (MODERATE): ICD-10-CM

## 2017-11-27 DIAGNOSIS — E11.22 TYPE 2 DIABETES MELLITUS WITH STAGE 3 CHRONIC KIDNEY DISEASE, WITHOUT LONG-TERM CURRENT USE OF INSULIN: ICD-10-CM

## 2017-11-27 DIAGNOSIS — E11.59 HYPERTENSION ASSOCIATED WITH DIABETES: ICD-10-CM

## 2017-11-27 DIAGNOSIS — Z85.038 HISTORY OF COLON CANCER: ICD-10-CM

## 2017-11-27 DIAGNOSIS — E78.2 DM TYPE 2 WITH DIABETIC MIXED HYPERLIPIDEMIA: ICD-10-CM

## 2017-11-27 DIAGNOSIS — N18.30 TYPE 2 DIABETES MELLITUS WITH STAGE 3 CHRONIC KIDNEY DISEASE, WITHOUT LONG-TERM CURRENT USE OF INSULIN: ICD-10-CM

## 2017-11-27 PROCEDURE — 99499 UNLISTED E&M SERVICE: CPT | Mod: S$GLB,,, | Performed by: INTERNAL MEDICINE

## 2017-11-27 PROCEDURE — 99396 PREV VISIT EST AGE 40-64: CPT | Mod: S$GLB,,, | Performed by: INTERNAL MEDICINE

## 2017-11-27 PROCEDURE — 99999 PR PBB SHADOW E&M-EST. PATIENT-LVL III: CPT | Mod: PBBFAC,,, | Performed by: INTERNAL MEDICINE

## 2017-11-27 RX ORDER — ALLOPURINOL 300 MG/1
300 TABLET ORAL DAILY
Qty: 90 TABLET | Refills: 3 | Status: SHIPPED | OUTPATIENT
Start: 2017-11-27 | End: 2018-12-27 | Stop reason: SDUPTHER

## 2017-11-27 NOTE — PROGRESS NOTES
Patient, Rickey Amaya Jr. (MRN #[id number]), presented with a recent Estimated Glumerular Filtration Rate (EGFR) between 15 and 29 consistent with the definition of chronic kidney disease stage 4 (ICD10 - N18.4).    eGFR if non    Date Value Ref Range Status   11/20/2017 27.7 (A) >60 mL/min/1.73 m^2 Final     Comment:     Calculation used to obtain the estimated glomerular filtration  rate (eGFR) is the CKD-EPI equation.          The patient's chronic kidney disease stage 4 was monitored, evaluated, addressed and/or treated. This addendum to the medical record is made on 11/27/2017.

## 2017-11-27 NOTE — PROGRESS NOTES
Patient, Rickey Amaya Jr. (MRN #[id number]), presented with a recorded BMI of 36.37 kg/m^2 and a documented comorbidity(s):  - Diabetes Mellitus Type 2  - Hypertension  - Hyperlipidemia  to which the severe obesity is a contributing factor. This is consistent with the definition of severe obesity (BMI 35.0-35.9) with comorbidity (ICD-10 E66.01, Z68.35). The patient's severe obesity was monitored, evaluated, addressed and/or treated. This addendum to the medical record is made on 11/27/2017.

## 2017-11-27 NOTE — PROGRESS NOTES
"HPI:  Patient is a 62-year-old man who comes in today for follow-up his diabetes, hypertension, lipids, chronic kidney disease and for his annual physical.  He had no new problems or complaints.  He denies any hypoglycemic events.  His blood pressure is been doing well.  His sugars have been doing fine.      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, stage III     Calculus of kidney     Chronic kidney disease, stage III (moderate)     Colostomy status     DM (diabetes mellitus), type 2 with complications     DM type 2 causing CKD stage 3     DM type 2 with diabetic mixed hyperlipidemia     History of colon cancer 2005    History of gout     Hyperlipidemia associated with type 2 diabetes mellitus     Hypertension associated with diabetes     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 Denies any nausea, vomiting or diarrhea. Denies any fever, chills or sweats. Denies any change in weight, voice, stool, skin or hair. Denies any dysuria, dyspepsia or dysphagia. Denies any change in vision, hearing or headaches. Denies any swollen lymph nodes or loss of memory.    PE:  /82   Pulse 86   Temp 98.2 °F (36.8 °C) (Tympanic)   Ht 5' 6" (1.676 m)   Wt 102.2 kg (225 lb 5 oz)   BMI 36.37 kg/m²   Gen: Well-developed, well-nourished, male, in no acute distress, oriented x3  HEENT: neck is supple, no adenopathy, carotids 2+ equal without bruits, thyroid exam " normal size without nodules.  CHEST: clear to auscultation and percussion  CVS: regular rate and rhythm without significant murmur, gallop, or rubs  ABD: soft, benign, no rebound no guarding, no distention.  Bowel sounds are normal.     nontender.  No palpable masses.  No organomegaly and no audible bruits.  He has a colostomy in the right upper quadrant  RECTAL: Deferred  EXT: no clubbing, cyanosis, or edema  LYMPH: no cervical, inguinal, or axillary adenopathy  FEET: no loss of sensation.  No ulcers or pressure sores.  Monofilament testing normal  NEURO: gait normal.  Cranial nerves II- XII intact. No nystagmus.  Speech normal.   Gross motor and sensory unremarkable.    Lab Results   Component Value Date    WBC 5.36 11/20/2017    HGB 11.5 (L) 11/20/2017    HCT 35.2 (L) 11/20/2017     11/20/2017    CHOL 162 11/20/2017    TRIG 383 (H) 11/20/2017    HDL 41 11/20/2017    ALT 17 11/20/2017    AST 22 11/20/2017     11/20/2017    K 4.6 11/20/2017     11/20/2017    CREATININE 2.4 (H) 11/20/2017    BUN 35 (H) 11/20/2017    CO2 22 (L) 11/20/2017    TSH 1.952 11/20/2017    PSA 0.13 08/21/2014    HGBA1C 8.9 (H) 11/20/2017       Impression:  Diabetes, not to goal.  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  He will contact his insurance company to see which is on the formulary.  Patient will see me again in 3 months with the above lab work.

## 2017-11-28 ENCOUNTER — TELEPHONE (OUTPATIENT)
Dept: INTERNAL MEDICINE | Facility: CLINIC | Age: 62
End: 2017-11-28

## 2017-11-28 RX ORDER — PEN NEEDLE, DIABETIC 33 GX5/32"
1 NEEDLE, DISPOSABLE MISCELLANEOUS DAILY
Qty: 100 EACH | Refills: 3 | Status: SHIPPED | OUTPATIENT
Start: 2017-11-28

## 2017-11-28 NOTE — TELEPHONE ENCOUNTER
----- Message from Radha Calvillo sent at 11/28/2017 11:07 AM CST -----  Contact: [facility]  pen needles sent in 33 gauge, can be changed to 32 gauge?....[phone]

## 2017-11-28 NOTE — TELEPHONE ENCOUNTER
----- Message from Celia Buckley LPN sent at 11/28/2017  9:55 AM CST -----  Patient states that his insurance states that they will cover Victoza. He would like to try it for 1 month before he signs up for the 30 day supply.

## 2017-11-29 ENCOUNTER — TELEPHONE (OUTPATIENT)
Dept: INTERNAL MEDICINE | Facility: CLINIC | Age: 62
End: 2017-11-29

## 2017-11-29 NOTE — TELEPHONE ENCOUNTER
Dr. Rouse, Mr. Amaya called. He states that he needs more test strips for his machine. He is also requesting an odor eliminator spray for his colostomy that they told him would be covered if you wrote an order for it. He also wants to know if he should continue taking the glimepiride now that he is on the Victoza because he does not believe that the glimepiride is effective anymore.     He asked us to fax the order for the spray to [phone].

## 2017-11-29 NOTE — TELEPHONE ENCOUNTER
He may stop the glimperide.  He should have the pharmacist send me a request for the odor eliminator spray.  I had no idea what it is called and, therefore, no idea how to look it up.  In regards to his test strips, again I have no idea what meter He is using so he needs to have the pharmacist send me a request for the specific strips He needs

## 2017-11-29 NOTE — TELEPHONE ENCOUNTER
Called pt.  Informed him of medication recommendation.  Pt voiced understanding.  Pt reports that he needs Trumetrix test strips and that the name of the spray is Odor Eliminator [place].  A written prescription has to be faxed.  Please write prescription.

## 2017-11-29 NOTE — TELEPHONE ENCOUNTER
----- Message from Diana Buck sent at 11/29/2017 11:01 AM CST -----  Contact: pt   Pt states that he need to talk to nurse. Pt didn't want to give me anymore info.   ..[phone] (home)

## 2017-12-05 ENCOUNTER — TELEPHONE (OUTPATIENT)
Dept: INTERNAL MEDICINE | Facility: CLINIC | Age: 62
End: 2017-12-05

## 2017-12-05 NOTE — TELEPHONE ENCOUNTER
----- Message from Graciela Hernandez sent at 12/5/2017 10:35 AM CST -----  Contact: Marisa/[facility]  Marisa calling regarding an order for Odor Eliminator  [place], please call @ [phone] or fax to [phone].

## 2017-12-05 NOTE — TELEPHONE ENCOUNTER
Pt needs odor elimination spray to [facility]  Last ov 11-27-17  Next ov 2-26-18  Labs 2-19-18  Called [facility]  They do not have the order Please rewrite and re fax the order

## 2017-12-05 NOTE — TELEPHONE ENCOUNTER
Spoke with pt  New on Victoza 0.6 dose shots(4 days)  Not helping sugars. FBS running over the 200's  Please advise P)[phone]  Pharmacy [facility]

## 2017-12-14 ENCOUNTER — TELEPHONE (OUTPATIENT)
Dept: INTERNAL MEDICINE | Facility: CLINIC | Age: 62
End: 2017-12-14

## 2017-12-14 NOTE — TELEPHONE ENCOUNTER
Only been on the Victoza for 10 days. He needs to finish up the current prescription strength of Victoza he has. Before he gets it refilled in about three weeks, if his morning fasting BS are averaging >140 he should call me so we can increase the strength.

## 2017-12-14 NOTE — TELEPHONE ENCOUNTER
Pt on Victoza @ 1 1/2 weeks. BS    Sat   AM Sunday 283  PM Sunday 254  Monday   Tues     Wed  T  This am 312.  Pt checking with [facility] before taking the Victoza. Pt using the Victoza in am.   Please Advise  Pt feels his BS is running too high

## 2017-12-21 ENCOUNTER — TELEPHONE (OUTPATIENT)
Dept: INTERNAL MEDICINE | Facility: CLINIC | Age: 62
End: 2017-12-21

## 2017-12-21 NOTE — TELEPHONE ENCOUNTER
----- Message from Chioma Chang sent at 12/21/2017  3:33 PM CST -----  Contact: Pt  Please give pt a call at  ..[phone] (home) regarding his elevated blood sugar.

## 2017-12-21 NOTE — TELEPHONE ENCOUNTER
Called pt.  He reports that his blood glucose was checked 12/19/17 result 460.  Was informed by Dr So to contact his PCP.  Pt reports that the Victoza is not working has been taking it for 3 weeks and his reading are still high.  Please advise.  Results also given to Dr. Yadav.

## 2017-12-22 ENCOUNTER — OFFICE VISIT (OUTPATIENT)
Dept: INTERNAL MEDICINE | Facility: CLINIC | Age: 62
End: 2017-12-22
Payer: MEDICARE

## 2017-12-22 VITALS
RESPIRATION RATE: 18 BRPM | BODY MASS INDEX: 34.91 KG/M2 | TEMPERATURE: 100 F | SYSTOLIC BLOOD PRESSURE: 128 MMHG | OXYGEN SATURATION: 97 % | HEART RATE: 97 BPM | WEIGHT: 216.25 LBS | DIASTOLIC BLOOD PRESSURE: 72 MMHG

## 2017-12-22 DIAGNOSIS — E78.2 DM TYPE 2 WITH DIABETIC MIXED HYPERLIPIDEMIA: Primary | ICD-10-CM

## 2017-12-22 DIAGNOSIS — E11.69 DM TYPE 2 WITH DIABETIC MIXED HYPERLIPIDEMIA: Primary | ICD-10-CM

## 2017-12-22 DIAGNOSIS — R73.9 HYPERGLYCEMIA: ICD-10-CM

## 2017-12-22 LAB — GLUCOSE SERPL-MCNC: 398 MG/DL (ref 70–110)

## 2017-12-22 PROCEDURE — 99213 OFFICE O/P EST LOW 20 MIN: CPT | Mod: S$GLB,,, | Performed by: FAMILY MEDICINE

## 2017-12-22 PROCEDURE — 82948 REAGENT STRIP/BLOOD GLUCOSE: CPT | Mod: S$GLB,,, | Performed by: FAMILY MEDICINE

## 2017-12-22 PROCEDURE — 99999 PR PBB SHADOW E&M-EST. PATIENT-LVL V: CPT | Mod: PBBFAC,,, | Performed by: FAMILY MEDICINE

## 2017-12-22 NOTE — PROGRESS NOTES
Subjective:       Patient ID: Rickey Amaya Jr. is a 62 y.o. male.    Chief Complaint: Elavated blood sugar      Patient here today for elevated blood glucose levels. Reports he saw his urologist Dr. So yesterday and blood glucose was 460, has been running in 300s at home recently. Is currently on Victoza 0.6 daily, has been on this for about 3 weeks now. Says that his urologist diagnosed him with an infection in his urinary tract yesterday, has to go  his antibiotics this morning.       Review of Systems   Constitutional: Negative for activity change, appetite change, fatigue and fever.   HENT: Negative for congestion.    Gastrointestinal: Negative for abdominal pain, diarrhea and nausea.   Musculoskeletal: Negative for myalgias.     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     Social History Main Topics    Smoking status: Former Smoker     Packs/day: 2.50     Years: 35.00     Types: Cigarettes     Quit  date: 7/7/2005    Smokeless tobacco: Never Used    Alcohol use No    Drug use: No    Sexual activity: Not on file     Other Topics Concern    Not on file     Social History Narrative    No narrative on file     Review of patient's allergies indicates:   Allergen Reactions    Diazepam Itching     Other reaction(s): Rash    Hydrocodone-acetaminophen      Other reaction(s): Rash    Metformin      Advised no metformin due to kidneys    Morphine Rash     Other reaction(s): Rash    Propoxyphene n-acetaminophen Rash    Propoxyphene-acetaminophen Rash       Objective:       /72 (BP Location: Right arm)   Pulse 97   Temp 99.6 °F (37.6 °C) (Tympanic)   Resp 18   Wt 98.1 kg (216 lb 4.3 oz)   SpO2 97%   BMI 34.91 kg/m²   Physical Exam   Constitutional: He appears well-developed and well-nourished. No distress.   Cardiovascular: Normal rate, regular rhythm and normal heart sounds.    Pulmonary/Chest: Effort normal and breath sounds normal. No respiratory distress.   Skin: He is not diaphoretic.   Nursing note and vitals reviewed.    Assessment:     1. DM type 2 with diabetic mixed hyperlipidemia    2. Hyperglycemia      Plan:   DM type 2 with diabetic mixed hyperlipidemia  -     POCT GLUCOSE    Hyperglycemia  -     POCT GLUCOSE    Patient instructed to increase Victoza to 1.2 units daily, keep log of fasting sugar levels.  Told to call us back if he gets readings over 400.  Medication List with Changes/Refills   Current Medications    ALLOPURINOL (ZYLOPRIM) 300 MG TABLET    Take 1 tablet (300 mg total) by mouth once daily.    BLOOD SUGAR DIAGNOSTIC (TRUE METRIX GLUCOSE TEST STRIP) STRP    1 strip by Misc.(Non-Drug; Combo Route) route 2 (two) times daily.    BLOOD SUGAR DIAGNOSTIC (TRUETEST TEST STRIPS) STRP    1 strip by Misc.(Non-Drug; Combo Route) route 2 (two) times daily.    BLOOD-GLUCOSE METER KIT    True Result Glucometer. Use as instructed    FENOFIBRATE 160 MG TAB    TAKE ONE TABLET BY MOUTH ONCE  "DAILY EVERY MORNING    FERROUS SULFATE 134 MG (27 MG IRON) TAB    Take 1 tablet by mouth 2 (two) times daily.    HYDROCHLOROTHIAZIDE (HYDRODIURIL) 25 MG TABLET    Take 1 tablet (25 mg total) by mouth once daily. 1 Tablet Oral Every day    HYDROXYZINE PAMOATE (VISTARIL) 25 MG CAP    Take 1 capsule (25 mg total) by mouth every 8 (eight) hours as needed.    HYOSCYAMINE (LEVSIN/SL) 0.125 MG SUBL        LIRAGLUTIDE 0.6 MG/0.1 ML, 18 MG/3 ML, SUBQ PNIJ 0.6 MG/0.1 ML (18 MG/3 ML) PNIJ    Inject 0.6 mg into the skin once daily.    METOPROLOL SUCCINATE (TOPROL-XL) 100 MG 24 HR TABLET    TAKE ONE TABLET BY MOUTH ONCE DAILY    MULTIVITAMIN (THERAGRAN) PER TABLET    Take by mouth. 1 Tablet Oral Every day    MULTIVITAMIN ORAL    Take by mouth.    MYCOPHENOLATE (CELLCEPT) 500 MG TAB    Take 3 tablets (1,500 mg total) by mouth 2 (two) times daily.    NYSTATIN-TRIAMCINOLONE (MYCOLOG II) CREAM        OMEGA 3-DHA-EPA-FISH OIL (OMEGA 3 FISH OIL) 900-1,400 MG CPDR    Take 2 capsules by mouth 2 (two) times daily.    PEN NEEDLE, DIABETIC 33 GAUGE X 5/32" NDLE    1 Units by Misc.(Non-Drug; Combo Route) route once daily.    PROMETHAZINE (PHENERGAN) 25 MG TABLET    Take 1 tablet (25 mg total) by mouth every 6 (six) hours as needed for Nausea.    SIMVASTATIN (ZOCOR) 40 MG TABLET    Take 1 tablet (40 mg total) by mouth every evening.    TAMSULOSIN (FLOMAX) 0.4 MG CP24    Take by mouth. 1 Capsule, Ext Release 24 hr Oral At bedtime    TIZANIDINE (ZANAFLEX) 2 MG TABLET    Take 1 tablet (2 mg total) by mouth 6 (six) times daily. 1 Capsule Oral Every day   Discontinued Medications    ASPIRIN 81 MG TAB    Take by mouth. 1 Tablet Oral Every day    CANAGLIFLOZIN (INVOKANA) 100 MG TAB        CINNAMON BARK (CINNAMON) 500 MG CAPSULE    Take 3,000 mg by mouth.    DOXYCYCLINE (VIBRA-TABS) 100 MG TABLET    Take 1 tablet (100 mg total) by mouth 2 (two) times daily.    GLIMEPIRIDE (AMARYL) 4 MG TABLET    Take 1 tablet (4 mg total) by mouth 2 (two) times " daily.

## 2017-12-22 NOTE — PATIENT INSTRUCTIONS
Increase victoza to 1.2 units daily.  Keep a record of your fasting blood sugars for the next week or two and call back, let us know what it has been running.

## 2017-12-26 RX ORDER — METOPROLOL SUCCINATE 100 MG/1
TABLET, EXTENDED RELEASE ORAL
Qty: 30 TABLET | Refills: 11 | Status: SHIPPED | OUTPATIENT
Start: 2017-12-26 | End: 2017-12-28 | Stop reason: SDUPTHER

## 2017-12-28 RX ORDER — METOPROLOL SUCCINATE 100 MG/1
100 TABLET, EXTENDED RELEASE ORAL DAILY
Qty: 30 TABLET | Refills: 11 | Status: SHIPPED | OUTPATIENT
Start: 2017-12-28 | End: 2019-01-04 | Stop reason: SDUPTHER

## 2018-01-02 ENCOUNTER — TELEPHONE (OUTPATIENT)
Dept: INTERNAL MEDICINE | Facility: CLINIC | Age: 63
End: 2018-01-02

## 2018-01-02 NOTE — TELEPHONE ENCOUNTER
Called pt, pt stated that his blood sugar rx was increased. He has been on the new rx for 2 weeks and his blood sugar is still 200. Pt stated that he is almost out of medicine and is there another alternation bc he hasn't seen any changes. Please advise.

## 2018-01-02 NOTE — TELEPHONE ENCOUNTER
----- Message from Chioma Chang sent at 1/2/2018 10:04 AM CST -----  Contact: pt  Please give pt a call at ..[phone] (home) regarding his Blood Sugar

## 2018-01-05 ENCOUNTER — OFFICE VISIT (OUTPATIENT)
Dept: INTERNAL MEDICINE | Facility: CLINIC | Age: 63
End: 2018-01-05
Payer: MEDICARE

## 2018-01-05 VITALS
TEMPERATURE: 98 F | SYSTOLIC BLOOD PRESSURE: 110 MMHG | HEART RATE: 90 BPM | WEIGHT: 215.81 LBS | OXYGEN SATURATION: 97 % | BODY MASS INDEX: 35.96 KG/M2 | RESPIRATION RATE: 20 BRPM | DIASTOLIC BLOOD PRESSURE: 68 MMHG | HEIGHT: 65 IN

## 2018-01-05 DIAGNOSIS — E11.69 DM TYPE 2 WITH DIABETIC MIXED HYPERLIPIDEMIA: ICD-10-CM

## 2018-01-05 DIAGNOSIS — N18.30 CHRONIC KIDNEY DISEASE, STAGE III (MODERATE): ICD-10-CM

## 2018-01-05 DIAGNOSIS — N18.30 BENIGN HYPERTENSIVE HEART AND KIDNEY DISEASE WITH CHRONIC KIDNEY DISEASE, STAGE III: ICD-10-CM

## 2018-01-05 DIAGNOSIS — N18.30 TYPE 2 DIABETES MELLITUS WITH STAGE 3 CHRONIC KIDNEY DISEASE, WITHOUT LONG-TERM CURRENT USE OF INSULIN: Primary | ICD-10-CM

## 2018-01-05 DIAGNOSIS — E78.2 DM TYPE 2 WITH DIABETIC MIXED HYPERLIPIDEMIA: ICD-10-CM

## 2018-01-05 DIAGNOSIS — N18.30 ANEMIA IN STAGE 3 CHRONIC KIDNEY DISEASE: ICD-10-CM

## 2018-01-05 DIAGNOSIS — D63.1 ANEMIA IN STAGE 3 CHRONIC KIDNEY DISEASE: ICD-10-CM

## 2018-01-05 DIAGNOSIS — I13.10 BENIGN HYPERTENSIVE HEART AND KIDNEY DISEASE WITH CHRONIC KIDNEY DISEASE, STAGE III: ICD-10-CM

## 2018-01-05 DIAGNOSIS — E11.8 TYPE 2 DIABETES MELLITUS WITH COMPLICATION, WITHOUT LONG-TERM CURRENT USE OF INSULIN: ICD-10-CM

## 2018-01-05 DIAGNOSIS — I15.2 HYPERTENSION ASSOCIATED WITH DIABETES: ICD-10-CM

## 2018-01-05 DIAGNOSIS — E11.59 HYPERTENSION ASSOCIATED WITH DIABETES: ICD-10-CM

## 2018-01-05 DIAGNOSIS — E11.22 TYPE 2 DIABETES MELLITUS WITH STAGE 3 CHRONIC KIDNEY DISEASE, WITHOUT LONG-TERM CURRENT USE OF INSULIN: Primary | ICD-10-CM

## 2018-01-05 PROCEDURE — 99213 OFFICE O/P EST LOW 20 MIN: CPT | Mod: S$GLB,,, | Performed by: INTERNAL MEDICINE

## 2018-01-05 PROCEDURE — 99999 PR PBB SHADOW E&M-EST. PATIENT-LVL III: CPT | Mod: PBBFAC,,, | Performed by: INTERNAL MEDICINE

## 2018-01-05 RX ORDER — GLIMEPIRIDE 4 MG/1
4 TABLET ORAL
Qty: 90 TABLET | Refills: 3 | Status: SHIPPED | OUTPATIENT
Start: 2018-01-05 | End: 2019-02-26 | Stop reason: SDUPTHER

## 2018-01-05 NOTE — PROGRESS NOTES
"HPI:  Patient is a 62-year-old man who comes in today for follow-up his diabetes.  Patient was taken off his Amaryl approximately 2 months ago, due to recurrent hypoglycemia.  He was placed on Victoza.  He started 0.6 mg was increased 1.5 mg a day about 2 weeks ago.  Despite that his sugars are still running constantly in 2-300 range.    Current meds have been verified and updated per the EMR  Exam:/68   Pulse 90   Temp 98.1 °F (36.7 °C) (Tympanic)   Resp 20   Ht 5' 5" (1.651 m)   Wt 97.9 kg (215 lb 13.3 oz)   SpO2 97%   BMI 35.92 kg/m²   Exam deferred    Lab Results   Component Value Date    WBC 5.36 11/20/2017    HGB 11.5 (L) 11/20/2017    HCT 35.2 (L) 11/20/2017     11/20/2017    CHOL 162 11/20/2017    TRIG 383 (H) 11/20/2017    HDL 41 11/20/2017    ALT 17 11/20/2017    AST 22 11/20/2017     11/20/2017    K 4.6 11/20/2017     11/20/2017    CREATININE 2.4 (H) 11/20/2017    BUN 35 (H) 11/20/2017    CO2 22 (L) 11/20/2017    TSH 1.952 11/20/2017    PSA 0.13 08/21/2014    HGBA1C 8.9 (H) 11/20/2017       Impression:  Diabetes, not well controlled  Patient Active Problem List   Diagnosis    Calculus of kidney    History of colon cancer    Retroperitoneal fibrosis    DM type 2 causing CKD stage 3    DM type 2 with diabetic mixed hyperlipidemia    Colostomy status    Hypertension associated with diabetes    DM (diabetes mellitus), type 2 with complications    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  I want to " have the above lab work in one month and see me at that time.  He should continue on her current dose of Victoza

## 2018-01-08 RX ORDER — FENOFIBRATE 160 MG/1
TABLET ORAL
Qty: 30 TABLET | Refills: 11 | Status: SHIPPED | OUTPATIENT
Start: 2018-01-08 | End: 2019-01-09

## 2018-02-12 ENCOUNTER — PATIENT OUTREACH (OUTPATIENT)
Dept: ADMINISTRATIVE | Facility: HOSPITAL | Age: 63
End: 2018-02-12

## 2018-02-12 NOTE — PROGRESS NOTES
Chart audit completed.    Spoke with [facility] regarding eye exam. They report last diabetic eye exam was 2013. Offered to schedule appt with [facility] and he declined, said he would like to go to same dr he has been going to.

## 2018-02-13 ENCOUNTER — TELEPHONE (OUTPATIENT)
Dept: INTERNAL MEDICINE | Facility: CLINIC | Age: 63
End: 2018-02-13

## 2018-02-13 NOTE — TELEPHONE ENCOUNTER
----- Message from Julio Arizmendi sent at 2/13/2018  9:48 AM CST -----  Pt is requesting a call from nurse to order more supplies.      Please all pt back at [phone]

## 2018-02-14 NOTE — TELEPHONE ENCOUNTER
Spoke with Lynne with [facility] and she stated that the patients paste and bags for his colostomy was ordered and will be shipped out in a couple days, called and informed the patient of this.

## 2018-03-08 RX ORDER — SIMVASTATIN 40 MG/1
TABLET, FILM COATED ORAL
Qty: 90 TABLET | Refills: 3 | OUTPATIENT
Start: 2018-03-08

## 2018-03-09 RX ORDER — SIMVASTATIN 40 MG/1
40 TABLET, FILM COATED ORAL NIGHTLY
Qty: 90 TABLET | Refills: 0 | Status: SHIPPED | OUTPATIENT
Start: 2018-03-09 | End: 2018-04-24

## 2018-03-09 RX ORDER — SIMVASTATIN 40 MG/1
TABLET, FILM COATED ORAL
Qty: 90 TABLET | Refills: 0 | Status: SHIPPED | OUTPATIENT
Start: 2018-03-09 | End: 2019-09-19 | Stop reason: SDUPTHER

## 2018-03-19 ENCOUNTER — LAB VISIT (OUTPATIENT)
Dept: LAB | Facility: HOSPITAL | Age: 63
End: 2018-03-19
Attending: INTERNAL MEDICINE
Payer: MEDICARE

## 2018-03-19 DIAGNOSIS — N13.5 STRICTURE OR KINKING OF URETER: Primary | ICD-10-CM

## 2018-03-19 DIAGNOSIS — Z15.89 DEFICIENCY OF DNA REPAIR: ICD-10-CM

## 2018-03-19 DIAGNOSIS — Z51.81 ENCOUNTER FOR THERAPEUTIC DRUG MONITORING: ICD-10-CM

## 2018-03-19 LAB
ALBUMIN SERPL BCP-MCNC: 3.7 G/DL
ALP SERPL-CCNC: 38 U/L
ALT SERPL W/O P-5'-P-CCNC: 16 U/L
ANION GAP SERPL CALC-SCNC: 11 MMOL/L
AST SERPL-CCNC: 19 U/L
BASOPHILS # BLD AUTO: 0.04 K/UL
BASOPHILS NFR BLD: 0.4 %
BILIRUB SERPL-MCNC: 0.5 MG/DL
BUN SERPL-MCNC: 37 MG/DL
CALCIUM SERPL-MCNC: 10.1 MG/DL
CHLORIDE SERPL-SCNC: 108 MMOL/L
CO2 SERPL-SCNC: 16 MMOL/L
CREAT SERPL-MCNC: 2.2 MG/DL
CRP SERPL-MCNC: 84.2 MG/L
DIFFERENTIAL METHOD: ABNORMAL
EOSINOPHIL # BLD AUTO: 0.2 K/UL
EOSINOPHIL NFR BLD: 1.9 %
ERYTHROCYTE [DISTWIDTH] IN BLOOD BY AUTOMATED COUNT: 14.3 %
ERYTHROCYTE [SEDIMENTATION RATE] IN BLOOD BY WESTERGREN METHOD: 35 MM/HR
EST. GFR  (AFRICAN AMERICAN): 35.5 ML/MIN/1.73 M^2
EST. GFR  (NON AFRICAN AMERICAN): 30.7 ML/MIN/1.73 M^2
GLUCOSE SERPL-MCNC: 254 MG/DL
HCT VFR BLD AUTO: 34.1 %
HGB BLD-MCNC: 11.2 G/DL
IMM GRANULOCYTES # BLD AUTO: 0.04 K/UL
IMM GRANULOCYTES NFR BLD AUTO: 0.4 %
LYMPHOCYTES # BLD AUTO: 1.4 K/UL
LYMPHOCYTES NFR BLD: 12.3 %
MCH RBC QN AUTO: 28 PG
MCHC RBC AUTO-ENTMCNC: 32.8 G/DL
MCV RBC AUTO: 85 FL
MONOCYTES # BLD AUTO: 0.7 K/UL
MONOCYTES NFR BLD: 5.8 %
NEUTROPHILS # BLD AUTO: 9.1 K/UL
NEUTROPHILS NFR BLD: 79.2 %
NRBC BLD-RTO: 0 /100 WBC
PLATELET # BLD AUTO: 325 K/UL
PMV BLD AUTO: 9.9 FL
POTASSIUM SERPL-SCNC: 4.2 MMOL/L
PROT SERPL-MCNC: 7.5 G/DL
RBC # BLD AUTO: 4 M/UL
SODIUM SERPL-SCNC: 135 MMOL/L
WBC # BLD AUTO: 11.42 K/UL

## 2018-03-19 PROCEDURE — 80053 COMPREHEN METABOLIC PANEL: CPT

## 2018-03-19 PROCEDURE — 85025 COMPLETE CBC W/AUTO DIFF WBC: CPT

## 2018-03-19 PROCEDURE — 86140 C-REACTIVE PROTEIN: CPT

## 2018-03-19 PROCEDURE — 85651 RBC SED RATE NONAUTOMATED: CPT

## 2018-03-19 PROCEDURE — 36415 COLL VENOUS BLD VENIPUNCTURE: CPT | Mod: PO

## 2018-03-29 ENCOUNTER — PATIENT OUTREACH (OUTPATIENT)
Dept: ADMINISTRATIVE | Facility: HOSPITAL | Age: 63
End: 2018-03-29

## 2018-04-23 ENCOUNTER — TELEPHONE (OUTPATIENT)
Dept: INTERNAL MEDICINE | Facility: CLINIC | Age: 63
End: 2018-04-23

## 2018-04-23 NOTE — TELEPHONE ENCOUNTER
Nurse Jo-Ann states she took patients pressure twice. Takes Lopressor 50 mg bid. Reading 176/93. Patient wanted to see you an appointment has been scheduled for tomorrow morning. Please advise.

## 2018-04-23 NOTE — TELEPHONE ENCOUNTER
----- Message from Michelle Kowalski sent at 4/23/2018  9:15 AM CDT -----  Contact: Home Health Nurse  She is calling in regards to wanting to leave a message concerning his bp 176/93, He takes Lopressor extended relief 50mg 2x daily.    She stated that the pt would like to come and see the Dr      She can be reached at [phone]

## 2018-04-24 ENCOUNTER — LAB VISIT (OUTPATIENT)
Dept: LAB | Facility: HOSPITAL | Age: 63
End: 2018-04-24
Attending: INTERNAL MEDICINE
Payer: MEDICARE

## 2018-04-24 ENCOUNTER — OFFICE VISIT (OUTPATIENT)
Dept: INTERNAL MEDICINE | Facility: CLINIC | Age: 63
End: 2018-04-24
Payer: MEDICARE

## 2018-04-24 VITALS
HEIGHT: 65 IN | BODY MASS INDEX: 35.26 KG/M2 | SYSTOLIC BLOOD PRESSURE: 144 MMHG | TEMPERATURE: 99 F | WEIGHT: 211.63 LBS | HEART RATE: 90 BPM | OXYGEN SATURATION: 99 % | DIASTOLIC BLOOD PRESSURE: 88 MMHG

## 2018-04-24 DIAGNOSIS — E78.2 DM TYPE 2 WITH DIABETIC MIXED HYPERLIPIDEMIA: ICD-10-CM

## 2018-04-24 DIAGNOSIS — E11.22 TYPE 2 DIABETES MELLITUS WITH STAGE 3 CHRONIC KIDNEY DISEASE, WITHOUT LONG-TERM CURRENT USE OF INSULIN: ICD-10-CM

## 2018-04-24 DIAGNOSIS — N18.30 CHRONIC KIDNEY DISEASE, STAGE III (MODERATE): ICD-10-CM

## 2018-04-24 DIAGNOSIS — N18.30 ANEMIA IN STAGE 3 CHRONIC KIDNEY DISEASE: ICD-10-CM

## 2018-04-24 DIAGNOSIS — N18.30 BENIGN HYPERTENSIVE HEART AND KIDNEY DISEASE WITH CHRONIC KIDNEY DISEASE, STAGE III: ICD-10-CM

## 2018-04-24 DIAGNOSIS — Z93.3 COLOSTOMY STATUS: ICD-10-CM

## 2018-04-24 DIAGNOSIS — I13.10 BENIGN HYPERTENSIVE HEART AND KIDNEY DISEASE WITH CHRONIC KIDNEY DISEASE, STAGE III: ICD-10-CM

## 2018-04-24 DIAGNOSIS — E78.2 DM TYPE 2 WITH DIABETIC MIXED HYPERLIPIDEMIA: Primary | ICD-10-CM

## 2018-04-24 DIAGNOSIS — E11.69 DM TYPE 2 WITH DIABETIC MIXED HYPERLIPIDEMIA: Primary | ICD-10-CM

## 2018-04-24 DIAGNOSIS — I15.2 HYPERTENSION ASSOCIATED WITH DIABETES: ICD-10-CM

## 2018-04-24 DIAGNOSIS — D63.1 ANEMIA IN STAGE 3 CHRONIC KIDNEY DISEASE: ICD-10-CM

## 2018-04-24 DIAGNOSIS — E11.8 TYPE 2 DIABETES MELLITUS WITH COMPLICATION, WITHOUT LONG-TERM CURRENT USE OF INSULIN: ICD-10-CM

## 2018-04-24 DIAGNOSIS — N18.30 TYPE 2 DIABETES MELLITUS WITH STAGE 3 CHRONIC KIDNEY DISEASE, WITHOUT LONG-TERM CURRENT USE OF INSULIN: ICD-10-CM

## 2018-04-24 DIAGNOSIS — E11.69 DM TYPE 2 WITH DIABETIC MIXED HYPERLIPIDEMIA: ICD-10-CM

## 2018-04-24 DIAGNOSIS — E11.59 HYPERTENSION ASSOCIATED WITH DIABETES: ICD-10-CM

## 2018-04-24 LAB
ALBUMIN SERPL BCP-MCNC: 2.7 G/DL
ALP SERPL-CCNC: 54 U/L
ALT SERPL W/O P-5'-P-CCNC: 20 U/L
ANION GAP SERPL CALC-SCNC: 10 MMOL/L
AST SERPL-CCNC: 19 U/L
BASOPHILS # BLD AUTO: 0.11 K/UL
BASOPHILS NFR BLD: 1.3 %
BILIRUB SERPL-MCNC: 0.3 MG/DL
BUN SERPL-MCNC: 48 MG/DL
CALCIUM SERPL-MCNC: 10.4 MG/DL
CHLORIDE SERPL-SCNC: 109 MMOL/L
CHOLEST SERPL-MCNC: 145 MG/DL
CHOLEST/HDLC SERPL: 3.2 {RATIO}
CO2 SERPL-SCNC: 18 MMOL/L
CREAT SERPL-MCNC: 2.6 MG/DL
DIFFERENTIAL METHOD: ABNORMAL
EOSINOPHIL # BLD AUTO: 0.3 K/UL
EOSINOPHIL NFR BLD: 3.3 %
ERYTHROCYTE [DISTWIDTH] IN BLOOD BY AUTOMATED COUNT: 14.4 %
EST. GFR  (AFRICAN AMERICAN): 28.8 ML/MIN/1.73 M^2
EST. GFR  (NON AFRICAN AMERICAN): 24.9 ML/MIN/1.73 M^2
ESTIMATED AVG GLUCOSE: 223 MG/DL
GLUCOSE SERPL-MCNC: 110 MG/DL
HBA1C MFR BLD HPLC: 9.4 %
HCT VFR BLD AUTO: 30.4 %
HDLC SERPL-MCNC: 46 MG/DL
HDLC SERPL: 31.7 %
HGB BLD-MCNC: 9.4 G/DL
IMM GRANULOCYTES # BLD AUTO: 0.03 K/UL
IMM GRANULOCYTES NFR BLD AUTO: 0.4 %
LDLC SERPL CALC-MCNC: 44.4 MG/DL
LYMPHOCYTES # BLD AUTO: 1.8 K/UL
LYMPHOCYTES NFR BLD: 21.5 %
MCH RBC QN AUTO: 27.3 PG
MCHC RBC AUTO-ENTMCNC: 30.9 G/DL
MCV RBC AUTO: 88 FL
MONOCYTES # BLD AUTO: 0.5 K/UL
MONOCYTES NFR BLD: 6.5 %
NEUTROPHILS # BLD AUTO: 5.5 K/UL
NEUTROPHILS NFR BLD: 67 %
NONHDLC SERPL-MCNC: 99 MG/DL
NRBC BLD-RTO: 0 /100 WBC
PLATELET # BLD AUTO: 362 K/UL
PMV BLD AUTO: 9.8 FL
POTASSIUM SERPL-SCNC: 5 MMOL/L
PROT SERPL-MCNC: 7.2 G/DL
RBC # BLD AUTO: 3.44 M/UL
SODIUM SERPL-SCNC: 137 MMOL/L
TRIGL SERPL-MCNC: 273 MG/DL
TSH SERPL DL<=0.005 MIU/L-ACNC: 1.95 UIU/ML
WBC # BLD AUTO: 8.19 K/UL

## 2018-04-24 PROCEDURE — 36415 COLL VENOUS BLD VENIPUNCTURE: CPT | Mod: PO

## 2018-04-24 PROCEDURE — 80053 COMPREHEN METABOLIC PANEL: CPT

## 2018-04-24 PROCEDURE — 85025 COMPLETE CBC W/AUTO DIFF WBC: CPT

## 2018-04-24 PROCEDURE — 99499 UNLISTED E&M SERVICE: CPT | Mod: S$GLB,,, | Performed by: INTERNAL MEDICINE

## 2018-04-24 PROCEDURE — 3079F DIAST BP 80-89 MM HG: CPT | Mod: CPTII,S$GLB,, | Performed by: INTERNAL MEDICINE

## 2018-04-24 PROCEDURE — 80061 LIPID PANEL: CPT

## 2018-04-24 PROCEDURE — 84443 ASSAY THYROID STIM HORMONE: CPT

## 2018-04-24 PROCEDURE — 99214 OFFICE O/P EST MOD 30 MIN: CPT | Mod: S$GLB,,, | Performed by: INTERNAL MEDICINE

## 2018-04-24 PROCEDURE — 3077F SYST BP >= 140 MM HG: CPT | Mod: CPTII,S$GLB,, | Performed by: INTERNAL MEDICINE

## 2018-04-24 PROCEDURE — 3045F PR MOST RECENT HEMOGLOBIN A1C LEVEL 7.0-9.0%: CPT | Mod: CPTII,S$GLB,, | Performed by: INTERNAL MEDICINE

## 2018-04-24 PROCEDURE — 99999 PR PBB SHADOW E&M-EST. PATIENT-LVL III: CPT | Mod: PBBFAC,,, | Performed by: INTERNAL MEDICINE

## 2018-04-24 PROCEDURE — 83036 HEMOGLOBIN GLYCOSYLATED A1C: CPT

## 2018-04-24 RX ORDER — HYDROXYZINE PAMOATE 25 MG/1
25 CAPSULE ORAL EVERY 8 HOURS PRN
Qty: 90 CAPSULE | Refills: 11 | Status: SHIPPED | OUTPATIENT
Start: 2018-04-24 | End: 2019-05-23 | Stop reason: SDUPTHER

## 2018-04-24 NOTE — PROGRESS NOTES
Patient, Rickey Amaya Jr. (MRN #[id number]), presented with a recorded BMI of 35.22 kg/m^2 and a documented comorbidity(s):  - Diabetes Mellitus Type 2  - Hypertension  - Hyperlipidemia  to which the severe obesity is a contributing factor. This is consistent with the definition of severe obesity (BMI 35.0-35.9) with comorbidity (ICD-10 E66.01, Z68.35). The patient's severe obesity was monitored, evaluated, addressed and/or treated. This addendum to the medical record is made on 04/24/2018.

## 2018-04-24 NOTE — TELEPHONE ENCOUNTER
Pt here requesting refill on Vistaril.  Pt requesting new order for 10  colostomy bags and 6 tubes of [employer] paste.  [facility]  Pt to call back with size of bags and amount of paste.

## 2018-04-24 NOTE — TELEPHONE ENCOUNTER
----- Message from Marquis Prieto sent at 4/24/2018  9:49 AM CDT -----  Contact: self [phone]  Would like return call from nurse.  Please call back at [phone].  Md Rosalba

## 2018-04-24 NOTE — TELEPHONE ENCOUNTER
Order info   -Adopt paste REF#[id number]  2.107/60grams  6 boxes    - bags REF #[id number]  2 3/4/ 70ml  10 bags per box   Order 10 boxes

## 2018-04-24 NOTE — PROGRESS NOTES
"HPI:  Patient is a 63-year-old man who comes today for follow-up his diabetes, hypertension, lipids.  Patient recently is been in the hospital for sepsis related to a lot of her renal obstruction secondary to his retroperitoneal fibrosis.  He now has bilateral percutaneous renal stents.  He is receiving IV antibiotics via PICC line.  His blood pressures been running high at home for the last several days.  I suspect this just due to stress.  He's been under.  He states his sugars have been doing well.  He denies any hypoglycemic events.    Current meds have been verified and updated per the EMR  Exam:BP (!) 144/88 (BP Location: Left arm, Patient Position: Sitting, BP Method: Large (Manual))   Pulse 90   Temp 98.5 °F (36.9 °C) (Tympanic)   Ht 5' 5" (1.651 m)   Wt 96 kg (211 lb 10.3 oz)   SpO2 99%   BMI 35.22 kg/m²   Chest clear  Cardiovascular regular rate and rhythm without murmur, gallop or rub  Extremities without edema    Lab Results   Component Value Date    WBC 11.42 03/19/2018    HGB 11.2 (L) 03/19/2018    HCT 34.1 (L) 03/19/2018     03/19/2018    CHOL 162 11/20/2017    TRIG 383 (H) 11/20/2017    HDL 41 11/20/2017    ALT 16 03/19/2018    AST 19 03/19/2018     (L) 03/19/2018    K 4.2 03/19/2018     03/19/2018    CREATININE 2.2 (H) 03/19/2018    BUN 37 (H) 03/19/2018    CO2 16 (L) 03/19/2018    TSH 1.952 11/20/2017    PSA 0.13 08/21/2014    HGBA1C 8.9 (H) 11/20/2017       Impression:  Multiple medical problems below, stable  Patient Active Problem List   Diagnosis    Calculus of kidney    History of colon cancer    Retroperitoneal fibrosis    DM type 2 causing CKD stage 3    DM type 2 with diabetic mixed hyperlipidemia    Colostomy status    Hypertension associated with diabetes    DM (diabetes mellitus), type 2 with complications    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  We will call him with results.  He'll be seen again in 3 months with additional lab work.  For now.  His medications remain the same

## 2018-04-27 ENCOUNTER — TELEPHONE (OUTPATIENT)
Dept: INTERNAL MEDICINE | Facility: CLINIC | Age: 63
End: 2018-04-27

## 2018-04-27 NOTE — TELEPHONE ENCOUNTER
----- Message from Octavio Rouse MD sent at 4/27/2018  3:01 PM CDT -----  Your diabetes test shows your control to be worse. I would like to see you to discuss your meds.

## 2018-04-30 ENCOUNTER — OFFICE VISIT (OUTPATIENT)
Dept: INTERNAL MEDICINE | Facility: CLINIC | Age: 63
End: 2018-04-30
Payer: MEDICARE

## 2018-04-30 VITALS
HEART RATE: 81 BPM | HEIGHT: 67 IN | BODY MASS INDEX: 33.87 KG/M2 | WEIGHT: 215.81 LBS | DIASTOLIC BLOOD PRESSURE: 80 MMHG | OXYGEN SATURATION: 98 % | RESPIRATION RATE: 16 BRPM | TEMPERATURE: 98 F | SYSTOLIC BLOOD PRESSURE: 138 MMHG

## 2018-04-30 DIAGNOSIS — E11.8 TYPE 2 DIABETES MELLITUS WITH COMPLICATION, WITHOUT LONG-TERM CURRENT USE OF INSULIN: Primary | ICD-10-CM

## 2018-04-30 DIAGNOSIS — E11.59 HYPERTENSION ASSOCIATED WITH DIABETES: ICD-10-CM

## 2018-04-30 DIAGNOSIS — I15.2 HYPERTENSION ASSOCIATED WITH DIABETES: ICD-10-CM

## 2018-04-30 PROCEDURE — 3079F DIAST BP 80-89 MM HG: CPT | Mod: CPTII,S$GLB,, | Performed by: INTERNAL MEDICINE

## 2018-04-30 PROCEDURE — 3046F HEMOGLOBIN A1C LEVEL >9.0%: CPT | Mod: CPTII,S$GLB,, | Performed by: INTERNAL MEDICINE

## 2018-04-30 PROCEDURE — 3075F SYST BP GE 130 - 139MM HG: CPT | Mod: CPTII,S$GLB,, | Performed by: INTERNAL MEDICINE

## 2018-04-30 PROCEDURE — 99213 OFFICE O/P EST LOW 20 MIN: CPT | Mod: S$GLB,,, | Performed by: INTERNAL MEDICINE

## 2018-04-30 PROCEDURE — 99499 UNLISTED E&M SERVICE: CPT | Mod: S$GLB,,, | Performed by: INTERNAL MEDICINE

## 2018-04-30 PROCEDURE — 99999 PR PBB SHADOW E&M-EST. PATIENT-LVL III: CPT | Mod: PBBFAC,,, | Performed by: INTERNAL MEDICINE

## 2018-04-30 RX ORDER — GLUCOSAMINE/CHONDR SU A SOD 167-133 MG
100 CAPSULE ORAL 2 TIMES DAILY WITH MEALS
COMMUNITY

## 2018-04-30 RX ORDER — LOPERAMIDE HCL 2 MG
2 TABLET ORAL 4 TIMES DAILY PRN
COMMUNITY
End: 2019-09-19

## 2018-04-30 RX ORDER — COLCHICINE 0.6 MG/1
0.6 TABLET ORAL DAILY PRN
COMMUNITY
End: 2019-09-19

## 2018-04-30 RX ORDER — NAPROXEN SODIUM 220 MG/1
81 TABLET, FILM COATED ORAL DAILY PRN
COMMUNITY

## 2018-04-30 NOTE — PROGRESS NOTES
"HPI:  Patient is a 63-year-old man who comes in today to discuss his recent lab work for his diabetes.  His hemoglobin A1 C was up to 9.4.  Patient apparently has been not taking the Victoza 1 his sugar is less than 100.  I explained to him.  That's not appropriate.  He needs to take the Victoza regardless.  Explained to him the Victoza does not cause her sugars go low.    Current meds have been verified and updated per the EMR  Exam:/80   Pulse 81   Temp 97.8 °F (36.6 °C)   Resp 16   Ht 5' 7" (1.702 m)   Wt 97.9 kg (215 lb 13.3 oz)   SpO2 98%   BMI 33.80 kg/m²   Exam deferred    Lab Results   Component Value Date    WBC 8.19 04/24/2018    HGB 9.4 (L) 04/24/2018    HCT 30.4 (L) 04/24/2018     (H) 04/24/2018    CHOL 145 04/24/2018    TRIG 273 (H) 04/24/2018    HDL 46 04/24/2018    ALT 20 04/24/2018    AST 19 04/24/2018     04/24/2018    K 5.0 04/24/2018     04/24/2018    CREATININE 2.6 (H) 04/24/2018    BUN 48 (H) 04/24/2018    CO2 18 (L) 04/24/2018    TSH 1.948 04/24/2018    PSA 0.13 08/21/2014    HGBA1C 9.4 (H) 04/24/2018       Impression:  Diabetes, and control not very good  Patient Active Problem List   Diagnosis    Calculus of kidney    History of colon cancer    Retroperitoneal fibrosis    DM type 2 causing CKD stage 3    DM type 2 with diabetic mixed hyperlipidemia    Colostomy status    Hypertension associated with diabetes    DM (diabetes mellitus), type 2 with complications    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  Patient will be seen again in 3 months.    "

## 2018-05-01 NOTE — PROGRESS NOTES
Patient, Rickey Amaya Jr. (MRN #[id number]), presented with a recent Estimated Glumerular Filtration Rate (EGFR) between 15 and 29 consistent with the definition of chronic kidney disease stage 4 (ICD10 - N18.4).    eGFR if non    Date Value Ref Range Status   04/24/2018 24.9 (A) >60 mL/min/1.73 m^2 Final     Comment:     Calculation used to obtain the estimated glomerular filtration  rate (eGFR) is the CKD-EPI equation.          The patient's chronic kidney disease stage 4 was monitored, evaluated, addressed and/or treated. This addendum to the medical record is made on 04/30/2018.

## 2018-07-09 ENCOUNTER — NURSE TRIAGE (OUTPATIENT)
Dept: ADMINISTRATIVE | Facility: CLINIC | Age: 63
End: 2018-07-09

## 2018-07-09 NOTE — TELEPHONE ENCOUNTER
Reason for Disposition   Caller has NON-URGENT medication question about med that PCP prescribed and triager unable to answer question    Protocols used: ST MEDICATION QUESTION CALL-A-    Pt states he needs blood glucose testing strips delivered by [facility] [employer] [facility] [employer]. Pt states he talked to [employer] last week and was told they were to be delivered by Saturday.  Pt states he still hasn't received them. Pt declines offer to have new prescription called into another pharmacy.  Pt states he will call company tomorrow.  Pt advised per protocol and verbalizes understanding.

## 2018-07-19 ENCOUNTER — TELEPHONE (OUTPATIENT)
Dept: INTERNAL MEDICINE | Facility: CLINIC | Age: 63
End: 2018-07-19

## 2018-07-19 ENCOUNTER — PATIENT OUTREACH (OUTPATIENT)
Dept: ADMINISTRATIVE | Facility: HOSPITAL | Age: 63
End: 2018-07-19

## 2018-07-19 NOTE — TELEPHONE ENCOUNTER
----- Message from Shalini Weiss LPN sent at 7/19/2018  9:37 AM CDT -----  Pt has lab appt coming. Do you want psa if so please order

## 2018-07-19 NOTE — TELEPHONE ENCOUNTER
I have not ordered a PSA and 4 years which tells me he is getting it done somewhere else.  Usually this means he is seeing outside urologist

## 2018-07-26 ENCOUNTER — CLINICAL SUPPORT (OUTPATIENT)
Dept: INTERNAL MEDICINE | Facility: CLINIC | Age: 63
End: 2018-07-26
Payer: MEDICARE

## 2018-07-26 ENCOUNTER — TELEPHONE (OUTPATIENT)
Dept: INTERNAL MEDICINE | Facility: CLINIC | Age: 63
End: 2018-07-26

## 2018-07-26 ENCOUNTER — LAB VISIT (OUTPATIENT)
Dept: LAB | Facility: HOSPITAL | Age: 63
End: 2018-07-26
Attending: INTERNAL MEDICINE
Payer: MEDICARE

## 2018-07-26 ENCOUNTER — OFFICE VISIT (OUTPATIENT)
Dept: INTERNAL MEDICINE | Facility: CLINIC | Age: 63
End: 2018-07-26
Payer: MEDICARE

## 2018-07-26 VITALS
HEIGHT: 66 IN | BODY MASS INDEX: 34.22 KG/M2 | OXYGEN SATURATION: 96 % | SYSTOLIC BLOOD PRESSURE: 138 MMHG | WEIGHT: 212.94 LBS | TEMPERATURE: 100 F | DIASTOLIC BLOOD PRESSURE: 82 MMHG | HEART RATE: 100 BPM

## 2018-07-26 DIAGNOSIS — L03.311 CELLULITIS OF ABDOMINAL WALL: Primary | ICD-10-CM

## 2018-07-26 DIAGNOSIS — Z79.2 NEED FOR PROPHYLACTIC ANTIBIOTIC: Primary | ICD-10-CM

## 2018-07-26 DIAGNOSIS — E78.2 DM TYPE 2 WITH DIABETIC MIXED HYPERLIPIDEMIA: ICD-10-CM

## 2018-07-26 DIAGNOSIS — E11.69 DM TYPE 2 WITH DIABETIC MIXED HYPERLIPIDEMIA: ICD-10-CM

## 2018-07-26 LAB
ALBUMIN SERPL BCP-MCNC: 3.2 G/DL
ALP SERPL-CCNC: 42 U/L
ALT SERPL W/O P-5'-P-CCNC: 13 U/L
ANION GAP SERPL CALC-SCNC: 12 MMOL/L
AST SERPL-CCNC: 22 U/L
BILIRUB SERPL-MCNC: 0.3 MG/DL
BUN SERPL-MCNC: 44 MG/DL
CALCIUM SERPL-MCNC: 10.1 MG/DL
CHLORIDE SERPL-SCNC: 105 MMOL/L
CHOLEST SERPL-MCNC: 125 MG/DL
CHOLEST/HDLC SERPL: 3.5 {RATIO}
CO2 SERPL-SCNC: 20 MMOL/L
CREAT SERPL-MCNC: 2.9 MG/DL
EST. GFR  (AFRICAN AMERICAN): 25.3 ML/MIN/1.73 M^2
EST. GFR  (NON AFRICAN AMERICAN): 21.9 ML/MIN/1.73 M^2
ESTIMATED AVG GLUCOSE: 206 MG/DL
GLUCOSE SERPL-MCNC: 219 MG/DL
HBA1C MFR BLD HPLC: 8.8 %
HDLC SERPL-MCNC: 36 MG/DL
HDLC SERPL: 28.8 %
LDLC SERPL CALC-MCNC: 26.2 MG/DL
NONHDLC SERPL-MCNC: 89 MG/DL
POTASSIUM SERPL-SCNC: 4.3 MMOL/L
PROT SERPL-MCNC: 7.6 G/DL
SODIUM SERPL-SCNC: 137 MMOL/L
TRIGL SERPL-MCNC: 314 MG/DL

## 2018-07-26 PROCEDURE — 36415 COLL VENOUS BLD VENIPUNCTURE: CPT | Mod: PO

## 2018-07-26 PROCEDURE — 83036 HEMOGLOBIN GLYCOSYLATED A1C: CPT

## 2018-07-26 PROCEDURE — 3079F DIAST BP 80-89 MM HG: CPT | Mod: CPTII,S$GLB,, | Performed by: NURSE PRACTITIONER

## 2018-07-26 PROCEDURE — 96372 THER/PROPH/DIAG INJ SC/IM: CPT | Mod: S$GLB,,, | Performed by: NURSE PRACTITIONER

## 2018-07-26 PROCEDURE — 99999 PR PBB SHADOW E&M-EST. PATIENT-LVL V: CPT | Mod: PBBFAC,,, | Performed by: NURSE PRACTITIONER

## 2018-07-26 PROCEDURE — 3075F SYST BP GE 130 - 139MM HG: CPT | Mod: CPTII,S$GLB,, | Performed by: NURSE PRACTITIONER

## 2018-07-26 PROCEDURE — 3008F BODY MASS INDEX DOCD: CPT | Mod: CPTII,S$GLB,, | Performed by: NURSE PRACTITIONER

## 2018-07-26 PROCEDURE — 99213 OFFICE O/P EST LOW 20 MIN: CPT | Mod: 25,S$GLB,, | Performed by: NURSE PRACTITIONER

## 2018-07-26 PROCEDURE — 99999 PR PBB SHADOW E&M-EST. PATIENT-LVL III: CPT | Mod: PBBFAC,,,

## 2018-07-26 PROCEDURE — 80053 COMPREHEN METABOLIC PANEL: CPT

## 2018-07-26 PROCEDURE — 80061 LIPID PANEL: CPT

## 2018-07-26 RX ORDER — SULFAMETHOXAZOLE AND TRIMETHOPRIM 800; 160 MG/1; MG/1
1 TABLET ORAL 2 TIMES DAILY
Qty: 20 TABLET | Refills: 0 | Status: SHIPPED | OUTPATIENT
Start: 2018-07-26 | End: 2018-09-19

## 2018-07-26 RX ORDER — CEFTRIAXONE 1 G/1
1 INJECTION, POWDER, FOR SOLUTION INTRAMUSCULAR; INTRAVENOUS
Status: COMPLETED | OUTPATIENT
Start: 2018-07-26 | End: 2018-07-26

## 2018-07-26 RX ADMIN — CEFTRIAXONE 1 G: 1 INJECTION, POWDER, FOR SOLUTION INTRAMUSCULAR; INTRAVENOUS at 01:07

## 2018-07-26 NOTE — PROGRESS NOTES
"Subjective:      Patient ID: Rickey Amaya Jr. is a 63 y.o. male.    Chief Complaint: Follow-up (cellulitis)    HPI:  Patient states a couple of days ago he has noticed a rash to the right side of his abdomen.  It is moderately painful.  Has a hx of recurrent cellulitis in the same area.  Was last treated 14 months ago with Bactrim with success.  Has had a hx of extensive abdominal surgeries    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, stage III     Calculus of kidney     Chronic kidney disease, stage III (moderate)     Colostomy status     DM (diabetes mellitus), type 2 with complications     DM type 2 causing CKD stage 3     DM type 2 with diabetic mixed hyperlipidemia     History of colon cancer 2005    History of gout     Hyperlipidemia associated with type 2 diabetes mellitus     Hypertension associated with diabetes     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       Past Surgical History:   Procedure Laterality Date    s/p exp lap times three      complications of colon cancer surgery    S/P partial coloectomy      with colostomy d/t colon cancer       Lab Results   Component Value Date    WBC 8.19 04/24/2018    HGB 9.4 (L) 04/24/2018    HCT 30.4 (L) 04/24/2018     (H) 04/24/2018    CHOL 145 04/24/2018    TRIG 273 (H) 04/24/2018    HDL 46 04/24/2018    ALT 20 04/24/2018    AST 19 04/24/2018     04/24/2018    K 5.0 04/24/2018     04/24/2018    CREATININE 2.6 (H) 04/24/2018    BUN 48 (H) 04/24/2018    CO2 18 (L) 04/24/2018    TSH 1.948 04/24/2018    PSA 0.13 08/21/2014    HGBA1C 9.4 (H) 04/24/2018       /82 (BP Location: Left arm, Patient Position: Sitting, BP Method: Medium (Manual))   Pulse 100   Temp 99.7 °F (37.6 °C) (Tympanic)   Ht 5' 6" (1.676 m)   Wt 96.6 kg (212 lb 15.4 oz)   SpO2 96%   BMI 34.37 kg/m²       Review of Systems   Constitutional: " Negative for appetite change, chills, diaphoresis and fever.   HENT: Negative for congestion, ear pain, postnasal drip, rhinorrhea, sneezing, sore throat and trouble swallowing.    Eyes: Negative for photophobia, pain and visual disturbance.   Respiratory: Negative for apnea, cough, choking, chest tightness, shortness of breath and wheezing.    Cardiovascular: Negative for chest pain, palpitations and leg swelling.   Gastrointestinal: Negative for abdominal pain, constipation, diarrhea, nausea and vomiting.   Genitourinary: Negative for decreased urine volume, difficulty urinating, dysuria, hematuria and urgency.   Musculoskeletal: Negative for arthralgias, gait problem, joint swelling and myalgias.   Skin: Negative for rash.   Neurological: Negative for dizziness, tremors, seizures, syncope, weakness, light-headedness, numbness and headaches.   Psychiatric/Behavioral: Negative for agitation, confusion, decreased concentration, hallucinations and sleep disturbance. The patient is not nervous/anxious.       Objective:     Physical Exam   Constitutional: He is oriented to person, place, and time. He appears well-developed and well-nourished. No distress.   Musculoskeletal:   Normal gait   Neurological: He is alert and oriented to person, place, and time.   Skin: Skin is warm and dry.   Right lateral abdomen is mildly diffuse redness, TTP   Psychiatric: He has a normal mood and affect. His behavior is normal.     Assessment:      1. Cellulitis of abdominal wall      Plan:   Cellulitis of abdominal wall    Other orders  -     sulfamethoxazole-trimethoprim 800-160mg (BACTRIM DS) 800-160 mg Tab; Take 1 tablet by mouth 2 (two) times daily.  Dispense: 20 tablet; Refill: 0    will let me know if not improving      Current Outpatient Prescriptions:     allopurinol (ZYLOPRIM) 300 MG tablet, Take 1 tablet (300 mg total) by mouth once daily., Disp: 90 tablet, Rfl: 3    aspirin 81 MG Chew, Take 81 mg by mouth daily as needed. ,  "Disp: , Rfl:     blood sugar diagnostic (TRUETEST TEST STRIPS) Strp, 1 strip by Misc.(Non-Drug; Combo Route) route 2 (two) times daily., Disp: 200 strip, Rfl: 3    colchicine 0.6 mg tablet, Take 0.6 mg by mouth daily as needed. , Disp: , Rfl:     fenofibrate 160 MG Tab, TAKE ONE TABLET BY MOUTH ONCE DAILY IN THE MORNING, Disp: 30 tablet, Rfl: 11    ferrous sulfate 134 mg (27 mg iron) Tab, Take 1 tablet by mouth 2 (two) times daily., Disp: , Rfl:     glimepiride (AMARYL) 4 MG tablet, Take 1 tablet (4 mg total) by mouth before breakfast., Disp: 90 tablet, Rfl: 3    hydrochlorothiazide (HYDRODIURIL) 25 MG tablet, Take 1 tablet (25 mg total) by mouth once daily. 1 Tablet Oral Every day, Disp: 30 tablet, Rfl: 11    hydrOXYzine pamoate (VISTARIL) 25 MG Cap, Take 1 capsule (25 mg total) by mouth every 8 (eight) hours as needed., Disp: 90 capsule, Rfl: 11    hyoscyamine (LEVSIN/SL) 0.125 mg Subl, , Disp: , Rfl:     lactobacillus combination no.9 (ADULT 50+ PROBIOTIC) 4 billion cell Cap, Take by mouth., Disp: , Rfl:     liraglutide 0.6 mg/0.1 mL, 18 mg/3 mL, subq PNIJ (VICTOZA 3-KIRBY) 0.6 mg/0.1 mL (18 mg/3 mL) PnIj, Inject 1.8 mg into the skin once daily., Disp: 9 mL, Rfl: 11    loperamide (IMODIUM A-D) 2 mg Tab, Take 2 mg by mouth 4 (four) times daily as needed., Disp: , Rfl:     metoprolol succinate (TOPROL-XL) 100 MG 24 hr tablet, Take 1 tablet (100 mg total) by mouth once daily., Disp: 30 tablet, Rfl: 11    multivitamin (THERAGRAN) per tablet, Take by mouth. 1 Tablet Oral Every day, Disp: , Rfl:     niacin 500 MG Tab, Take 100 mg by mouth every evening., Disp: , Rfl:     nystatin-triamcinolone (MYCOLOG II) cream, , Disp: , Rfl:     pen needle, diabetic 33 gauge x 5/32" Ndle, 1 Units by Misc.(Non-Drug; Combo Route) route once daily., Disp: 100 each, Rfl: 3    promethazine (PHENERGAN) 25 MG tablet, Take 1 tablet (25 mg total) by mouth every 6 (six) hours as needed for Nausea., Disp: 30 tablet, Rfl: 5    " simvastatin (ZOCOR) 40 MG tablet, TAKE ONE TABLET BY MOUTH ONCE DAILY IN THE EVENING, Disp: 90 tablet, Rfl: 0    tamsulosin (FLOMAX) 0.4 mg Cp24, Take by mouth. 1 Capsule, Ext Release 24 hr Oral At bedtime, Disp: , Rfl:     tiZANidine (ZANAFLEX) 2 MG tablet, Take 1 tablet (2 mg total) by mouth 6 (six) times daily. 1 Capsule Oral Every day, Disp: 360 tablet, Rfl: 0    sulfamethoxazole-trimethoprim 800-160mg (BACTRIM DS) 800-160 mg Tab, Take 1 tablet by mouth 2 (two) times daily., Disp: 20 tablet, Rfl: 0

## 2018-07-26 NOTE — PROGRESS NOTES
Ceftriaxone 1 gram reconstituted with 2.1 ml 1% Lidocaine.  Lot# [id number] exp 11/2020 mfg Sandoz.  Pt advised to wait in clinic 15 minutes to monitor for side effects.  Pt voiced understanding and tolerated injection well.  Temp 100.5 tympanic B/P118/59 Pulse 103 O2 sat 95%.

## 2018-07-26 NOTE — TELEPHONE ENCOUNTER
----- Message from Radha Calvillo sent at 7/26/2018 11:53 AM CDT -----  states that he was told if rash gets worse to come back in. made appt today just in case...[phone] (home)

## 2018-08-02 ENCOUNTER — OFFICE VISIT (OUTPATIENT)
Dept: INTERNAL MEDICINE | Facility: CLINIC | Age: 63
End: 2018-08-02
Payer: MEDICARE

## 2018-08-02 VITALS
HEART RATE: 91 BPM | DIASTOLIC BLOOD PRESSURE: 74 MMHG | TEMPERATURE: 98 F | BODY MASS INDEX: 34.26 KG/M2 | HEIGHT: 66 IN | OXYGEN SATURATION: 97 % | WEIGHT: 213.19 LBS | SYSTOLIC BLOOD PRESSURE: 124 MMHG

## 2018-08-02 DIAGNOSIS — N18.4 TYPE 2 DIABETES MELLITUS WITH STAGE 4 CHRONIC KIDNEY DISEASE, WITH LONG-TERM CURRENT USE OF INSULIN: ICD-10-CM

## 2018-08-02 DIAGNOSIS — I15.2 HYPERTENSION ASSOCIATED WITH DIABETES: ICD-10-CM

## 2018-08-02 DIAGNOSIS — D63.1 ANEMIA IN STAGE 3 CHRONIC KIDNEY DISEASE: ICD-10-CM

## 2018-08-02 DIAGNOSIS — E11.22 TYPE 2 DIABETES MELLITUS WITH STAGE 4 CHRONIC KIDNEY DISEASE, WITH LONG-TERM CURRENT USE OF INSULIN: ICD-10-CM

## 2018-08-02 DIAGNOSIS — N18.4 CKD (CHRONIC KIDNEY DISEASE), STAGE IV: ICD-10-CM

## 2018-08-02 DIAGNOSIS — E11.59 HYPERTENSION ASSOCIATED WITH DIABETES: ICD-10-CM

## 2018-08-02 DIAGNOSIS — I13.10: ICD-10-CM

## 2018-08-02 DIAGNOSIS — E11.69 DM TYPE 2 WITH DIABETIC MIXED HYPERLIPIDEMIA: Primary | ICD-10-CM

## 2018-08-02 DIAGNOSIS — Z79.4 TYPE 2 DIABETES MELLITUS WITH STAGE 4 CHRONIC KIDNEY DISEASE, WITH LONG-TERM CURRENT USE OF INSULIN: ICD-10-CM

## 2018-08-02 DIAGNOSIS — N18.30 ANEMIA IN STAGE 3 CHRONIC KIDNEY DISEASE: ICD-10-CM

## 2018-08-02 DIAGNOSIS — E11.8 TYPE 2 DIABETES MELLITUS WITH COMPLICATION, WITHOUT LONG-TERM CURRENT USE OF INSULIN: ICD-10-CM

## 2018-08-02 DIAGNOSIS — N18.4: ICD-10-CM

## 2018-08-02 DIAGNOSIS — E78.2 DM TYPE 2 WITH DIABETIC MIXED HYPERLIPIDEMIA: Primary | ICD-10-CM

## 2018-08-02 PROCEDURE — 3045F PR MOST RECENT HEMOGLOBIN A1C LEVEL 7.0-9.0%: CPT | Mod: CPTII,S$GLB,, | Performed by: INTERNAL MEDICINE

## 2018-08-02 PROCEDURE — 3074F SYST BP LT 130 MM HG: CPT | Mod: CPTII,S$GLB,, | Performed by: INTERNAL MEDICINE

## 2018-08-02 PROCEDURE — 3008F BODY MASS INDEX DOCD: CPT | Mod: CPTII,S$GLB,, | Performed by: INTERNAL MEDICINE

## 2018-08-02 PROCEDURE — 3078F DIAST BP <80 MM HG: CPT | Mod: CPTII,S$GLB,, | Performed by: INTERNAL MEDICINE

## 2018-08-02 PROCEDURE — 99999 PR PBB SHADOW E&M-EST. PATIENT-LVL III: CPT | Mod: PBBFAC,,, | Performed by: INTERNAL MEDICINE

## 2018-08-02 PROCEDURE — 99214 OFFICE O/P EST MOD 30 MIN: CPT | Mod: S$GLB,,, | Performed by: INTERNAL MEDICINE

## 2018-08-02 NOTE — PROGRESS NOTES
"HPI:  Patient is a 63-year-old man who comes in today for follow-up of his diabetes, hypertension, chronic kidney disease, and lipids.  He states he has been doing well.  He recently had his percutaneous nephrostomy stents removed.  He he is now draining internally.  He has had repetitive problems with hydronephrosis secondary to retroperitoneal fibrosis.  His heart it really know how much of his chronic kidney disease is related to his retroperitoneal fibrosis and hydronephrosis versus his diabetes.  He states sugars have been doing better.  He denies any hypoglycemic events.  He admits that he is not very good with his diet.    Current meds have been verified and updated per the EMR  Exam:/74 (BP Location: Right arm, Patient Position: Sitting, BP Method: Medium (Manual))   Pulse 91   Temp 97.8 °F (36.6 °C) (Tympanic)   Ht 5' 6" (1.676 m)   Wt 96.7 kg (213 lb 3 oz)   SpO2 97%   BMI 34.41 kg/m²   Chest clear  Cardiovascular regular rate and rhythm without murmur    Lab Results   Component Value Date    WBC 8.19 04/24/2018    HGB 9.4 (L) 04/24/2018    HCT 30.4 (L) 04/24/2018     (H) 04/24/2018    CHOL 125 07/26/2018    TRIG 314 (H) 07/26/2018    HDL 36 (L) 07/26/2018    ALT 13 07/26/2018    AST 22 07/26/2018     07/26/2018    K 4.3 07/26/2018     07/26/2018    CREATININE 2.9 (H) 07/26/2018    BUN 44 (H) 07/26/2018    CO2 20 (L) 07/26/2018    TSH 1.948 04/24/2018    PSA 0.13 08/21/2014    HGBA1C 8.8 (H) 07/26/2018       Impression:  Diabetes, not to goal  Chronic kidney disease, progressive, stage IV.  Hypertension and lipids, well controlled  Patient Active Problem List   Diagnosis    Calculus of kidney    History of colon cancer    Retroperitoneal fibrosis    DM type 2 with diabetic mixed hyperlipidemia    Colostomy status    Hypertension associated with diabetes    DM (diabetes mellitus), type 2 with complications    History of gout    Anemia in CKD (chronic kidney disease) "    DM type 2 causing CKD stage 4    CKD (chronic kidney disease), stage IV    Benign hypertensive heart and kidney disease with chronic kidney disease, stage IV       Plan:  Orders Placed This Encounter    Hemoglobin A1c    Lipid panel    Comprehensive metabolic panel    TSH    Protein / creatinine ratio, urine    CBC auto differential    Ambulatory consult to Nephrology     Patient was referred to see a nephrologist.  He will see me again in roughly 3 months with the above lab work.

## 2018-09-19 ENCOUNTER — OFFICE VISIT (OUTPATIENT)
Dept: NEPHROLOGY | Facility: CLINIC | Age: 63
End: 2018-09-19
Payer: MEDICARE

## 2018-09-19 VITALS
HEART RATE: 80 BPM | WEIGHT: 209.69 LBS | DIASTOLIC BLOOD PRESSURE: 70 MMHG | SYSTOLIC BLOOD PRESSURE: 132 MMHG | HEIGHT: 66 IN | BODY MASS INDEX: 33.7 KG/M2

## 2018-09-19 DIAGNOSIS — N18.4 CKD (CHRONIC KIDNEY DISEASE) STAGE 4, GFR 15-29 ML/MIN: Primary | ICD-10-CM

## 2018-09-19 DIAGNOSIS — E11.29 PROTEINURIA DUE TO TYPE 2 DIABETES MELLITUS: ICD-10-CM

## 2018-09-19 DIAGNOSIS — R80.9 PROTEINURIA DUE TO TYPE 2 DIABETES MELLITUS: ICD-10-CM

## 2018-09-19 PROCEDURE — 3045F PR MOST RECENT HEMOGLOBIN A1C LEVEL 7.0-9.0%: CPT | Mod: CPTII,,, | Performed by: INTERNAL MEDICINE

## 2018-09-19 PROCEDURE — 99999 PR PBB SHADOW E&M-EST. PATIENT-LVL III: CPT | Mod: PBBFAC,,, | Performed by: INTERNAL MEDICINE

## 2018-09-19 PROCEDURE — 99213 OFFICE O/P EST LOW 20 MIN: CPT | Mod: PBBFAC | Performed by: INTERNAL MEDICINE

## 2018-09-19 PROCEDURE — 3008F BODY MASS INDEX DOCD: CPT | Mod: CPTII,,, | Performed by: INTERNAL MEDICINE

## 2018-09-19 PROCEDURE — 99499 UNLISTED E&M SERVICE: CPT | Mod: S$GLB,,, | Performed by: INTERNAL MEDICINE

## 2018-09-19 PROCEDURE — 3075F SYST BP GE 130 - 139MM HG: CPT | Mod: CPTII,,, | Performed by: INTERNAL MEDICINE

## 2018-09-19 PROCEDURE — 3078F DIAST BP <80 MM HG: CPT | Mod: CPTII,,, | Performed by: INTERNAL MEDICINE

## 2018-09-19 PROCEDURE — 99205 OFFICE O/P NEW HI 60 MIN: CPT | Mod: S$PBB,,, | Performed by: INTERNAL MEDICINE

## 2018-09-19 NOTE — PATIENT INSTRUCTIONS
Patient was advised to hydrate with 60-80 ounces of water per day.     Avoid NSAID pain medications such as advil, aleve, motrin, ibuprofen, naprosyn, meloxicam, diclofenac, mobic.

## 2018-09-19 NOTE — LETTER
September 19, 2018      Octavio Rouse MD  [address]  [address]  [facility] [address]           [facility]  [address] [place] [id number]  Phone: [phone]  Fax: [phone]          Patient: Rickey Amaya Jr.   MR Number: [id number]   YOB: 1955   Date of Visit: 9/19/2018       Dear Dr. Octavio Rouse:    Thank you for referring Rickey Amaya to me for evaluation. Attached you will find relevant portions of my assessment and plan of care.    If you have questions, please do not hesitate to call me. I look forward to following Rickey Amaya along with you.    Sincerely,    Jim Pace MD    Enclosure  CC:  No Recipients    If you would like to receive this communication electronically, please contact [email or web address] or [phone] to request more information on [employer] access.    For providers and/or their staff who would like to refer a patient to [facility], please contact us through our one-stop-shop provider referral line, [facility], at [phone].    If you feel you have received this communication in error or would no longer like to receive these types of communications, please e-mail [email or web address]

## 2018-09-19 NOTE — PROGRESS NOTES
NEPHROLOGY CONSULTATION    PHYSICIAN REQUESTING THE CONSULT: Dr. Octavio Rouse    REASON FOR CONSULTATION: Renal insufficiency    63 y.o. male with history of retroperitoneal fibrosis, HTN, gout, colon cancer, DM2, nephrolithiasis, anemia, incontinence presents to the renal clinic for evaluation of renal insufficiency. A consultation has been requested by the patient's PMD, Dr. Octavio Rouse. Patient today presents to the clinic complaining of mild intermittent hematuria. He denies any headaches, congenital hearing loss, chest pain, SOB, hemoptysis, abdominal or flank pain, nausea/vomiting, dysuria, LE swelling, rashes, hand/foot paresthesia, nasal congestion. Patient denies any  NSAID use history.   Patient has a longstanding history of DM2 that was diagnosed > 10 years ago. He is not aware of any associated diabetic retinopathy. Blood glucose is currently poorly controlled with last HgA1c at 8.8 (7/26/18). Patient also reports > 20 year history of hypertension. BP is currently well controlled at 132/70. In addition, patient reports history of retroperitoneal fibrosis (diagnosed on 2015, he has bilateral ureteral stents that are being changed every 4 months by Dr. So, he reports occasional mild hematuria as result of stent placement), gout (in left big toe, on daily allopurinol, uses colchicine for intermittent attacks), colon cancer (s/p colectomy, colostomy bag in place, he had multiple abdominal surgeries), nephrolithiasis (passed stone once or twice in past), incontinence (patient is wearing pads, states that he developed incontinence after abdominal surgeries). He denies any history of heart disease. Patient consumes copious amounts of fluids per day. Patient's father was on dialysis during last 6 months of his life. Laboratory review revealed that the patient's renal function has been worsening over time with creatinine increasing from 0.9 on 6/6/12 to 2.9 on 7/26/18.       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       Past Surgical History:   Procedure Laterality Date    s/p exp lap times three      complications of colon cancer surgery    S/P partial coloectomy      with colostomy d/t colon cancer       Review of patient's allergies indicates:   Allergen Reactions    Hydrocodone-acetaminophen      Other reaction(s): Rash    Metformin      Advised no metformin due to kidneys    Diazepam Itching and Rash     Other reaction(s): Itching  Other reaction(s): Rash    Morphine Rash     Other reaction(s): Rash  Other reaction(s): Rash  Other reaction(s): Rash    Propoxyphene n-acetaminophen Rash     Other reaction(s): Rash    Propoxyphene-acetaminophen Rash       Current Outpatient Medications   Medication Sig Dispense Refill    allopurinol (ZYLOPRIM) 300 MG tablet Take 1 tablet (300 mg total) by mouth once daily. 90 tablet 3    aspirin 81 MG Chew Take 81 mg by mouth daily as needed.       blood sugar diagnostic (TRUETEST TEST STRIPS) Strp 1 strip by Misc.(Non-Drug; Combo Route) route 2 (two) times daily. 200 strip 3    colchicine 0.6 mg tablet Take 0.6 mg by mouth daily as needed.       fenofibrate 160 MG Tab TAKE ONE TABLET BY MOUTH ONCE DAILY IN THE MORNING 30 tablet 11    ferrous sulfate 134 mg (27 mg iron) Tab Take 1 tablet by mouth 2 (two) times daily.      glimepiride (AMARYL) 4 MG tablet Take 1 tablet (4 mg total) by mouth before breakfast. 90 tablet  "3    hydrochlorothiazide (HYDRODIURIL) 25 MG tablet Take 1 tablet (25 mg total) by mouth once daily. 1 Tablet Oral Every day 30 tablet 11    hydrOXYzine pamoate (VISTARIL) 25 MG Cap Take 1 capsule (25 mg total) by mouth every 8 (eight) hours as needed. 90 capsule 11    hyoscyamine (LEVSIN/SL) 0.125 mg Subl       lactobacillus combination no.9 (ADULT 50+ PROBIOTIC) 4 billion cell Cap Take by mouth.      liraglutide 0.6 mg/0.1 mL, 18 mg/3 mL, subq PNIJ (VICTOZA 3-KIRBY) 0.6 mg/0.1 mL (18 mg/3 mL) PnIj Inject 1.8 mg into the skin once daily. 9 mL 11    loperamide (IMODIUM A-D) 2 mg Tab Take 2 mg by mouth 4 (four) times daily as needed.      metoprolol succinate (TOPROL-XL) 100 MG 24 hr tablet Take 1 tablet (100 mg total) by mouth once daily. 30 tablet 11    multivitamin (THERAGRAN) per tablet Take by mouth. 1 Tablet Oral Every day      niacin 500 MG Tab Take 100 mg by mouth 2 (two) times daily with meals.       nystatin-triamcinolone (MYCOLOG II) cream       pen needle, diabetic 33 gauge x 5/32" Ndle 1 Units by Misc.(Non-Drug; Combo Route) route once daily. 100 each 3    promethazine (PHENERGAN) 25 MG tablet Take 1 tablet (25 mg total) by mouth every 6 (six) hours as needed for Nausea. 30 tablet 5    simvastatin (ZOCOR) 40 MG tablet TAKE ONE TABLET BY MOUTH ONCE DAILY IN THE EVENING 90 tablet 0    tamsulosin (FLOMAX) 0.4 mg Cp24 Take by mouth. 1 Capsule, Ext Release 24 hr Oral At bedtime      tiZANidine (ZANAFLEX) 2 MG tablet Take 1 tablet (2 mg total) by mouth 6 (six) times daily. 1 Capsule Oral Every day 360 tablet 0     No current facility-administered medications for this visit.        Family History   Problem Relation Age of Onset    Diabetes Mother        Social History     Socioeconomic History    Marital status:      Spouse name: Not on file    Number of children: Not on file    Years of education: Not on file    Highest education level: Not on file   Social Needs    Financial resource " "strain: Not on file    Food insecurity - worry: Not on file    Food insecurity - inability: Not on file    Transportation needs - medical: Not on file    Transportation needs - non-medical: Not on file   Occupational History    Not on file   Tobacco Use    Smoking status: Former Smoker     Packs/day: 2.50     Years: 35.00     Pack years: 87.50     Types: Cigarettes     Last attempt to quit: 2005     Years since quittin.2    Smokeless tobacco: Never Used   Substance and Sexual Activity    Alcohol use: No    Drug use: No    Sexual activity: No   Other Topics Concern    Not on file   Social History Narrative    Not on file       Review of Systems:  1. GENERAL: patient denies any fever, weight changes, generalized weakness, dizziness.  2. HEENT: patient denies headaches, visual disturbances, swallowing problems, sinus pain, nasal congestion.  3. CARDIOVASCULAR: patient denies chest pain, palpitations.  4. PULMONARY: patient denies SOB, coughing, hemoptysis, wheezing.  5. GASTROINTESTINAL: patient denies abdominal pain, nausea, vomiting, diarrhea, colostomy bag in place  6. GENITOURINARY: patient denies dysuria, he reports intermittent hematuria, no hesitancy, frequency.  7. EXTREMITIES: patient denies LE edema, LE cramping.  8. DERMATOLOGY: patient denies rashes, ulcers.  9. NEURO: patient denies tremors, extremity weakness, extremity numbness/tingling.  10. MUSCULOSKELETAL: patient denies joint pain, joint swelling.  11. HEMATOLOGY: patient denies rectal or gum bleeding.  12: PSYCH: patient denies anxiety, depression.      PHYSICAL EXAM:    /70   Pulse 80   Ht 5' 6" (1.676 m)   Wt 95.1 kg (209 lb 10.5 oz)   BMI 33.84 kg/m²     GENERAL: Pleasant gentleman presents to clinic with non-labored breathing.  HEENT: PER, no nasal discharge, no icterus, no oral exudates, moist mucosal membranes, bilateral shoulder fat pads  NECK: no thyroid mass, no lymphadenopathy.  HEART: RRR S1/S2, no rubs, " good peripheral pulses.  LUNGS: CTA bilaterally, no wheezing, breathing is nonlabored.  ABDOMEN: soft, nontender, not distended, bowel sounds are present, no abdominal hernia, right sided colostomy (with bag)  EXTREM: mild bilateral ankle edema.  SKIN: no rashes, skin is warm and dry.  NEURO: A & O x 3, no obvious focal signs.    LABORATORY RESULTS:    Lab Results   Component Value Date    CREATININE 2.9 (H) 07/26/2018    BUN 44 (H) 07/26/2018     07/26/2018    K 4.3 07/26/2018     07/26/2018    CO2 20 (L) 07/26/2018      Lab Results   Component Value Date    CALCIUM 10.1 07/26/2018    PHOS 3.5 07/08/2014     Lab Results   Component Value Date    ALBUMIN 3.2 (L) 07/26/2018     Lab Results   Component Value Date    WBC 8.19 04/24/2018    HGB 9.4 (L) 04/24/2018    HCT 30.4 (L) 04/24/2018    MCV 88 04/24/2018     (H) 04/24/2018     Protein Creatinine Ratios:    Creatinine, Random Ur   Date Value Ref Range Status   11/20/2017 94.0 23.0 - 375.0 mg/dL Final     Comment:     The random urine reference ranges provided were established   for 24 hour urine collections.  No reference ranges exist for  random urine specimens.  Correlate clinically.     07/28/2016 143.0 23.0 - 375.0 mg/dL Final     Comment:     The random urine reference ranges provided were established   for 24 hour urine collections.  No reference ranges exist for  random urine specimens.  Correlate clinically.     08/26/2015 100.0 23.0 - 375.0 mg/dL Final     Comment:     The random urine reference ranges provided were established   for 24 hour urine collections.  No reference ranges exist for  random urine specimens.  Correlate clinically.       Protein, Urine Random   Date Value Ref Range Status   11/20/2017 188 (H) 0 - 15 mg/dL Final     Comment:     The random urine reference ranges provided were established   for 24 hour urine collections.  No reference ranges exist for  random urine specimens.  Correlate clinically.     07/28/2016 381  (H) 0 - 15 mg/dL Final     Comment:     The random urine reference ranges provided were established   for 24 hour urine collections.  No reference ranges exist for  random urine specimens.  Correlate clinically.     08/26/2015 155 (H) 0 - 15 mg/dL Final     Comment:     The random urine reference ranges provided were established   for 24 hour urine collections.  No reference ranges exist for  random urine specimens.  Correlate clinically.       Prot/Creat Ratio, Ur   Date Value Ref Range Status   11/20/2017 2.00 (H) 0.00 - 0.20 Final   07/28/2016 2.66 (H) 0.00 - 0.20 Final   08/26/2015 1.6 (H) 0.0 - 0.2 Final           ASSESSMENT AND PLAN:  63 y.o. male with history of retroperitoneal fibrosis, HTN, gout, colon cancer, DM2, nephrolithiasis, anemia, incontinence presents to the renal clinic for evaluation of renal insufficiency.    1. Renal insufficiency: Patient presents with renal insufficiency, consistent with CKD stage 4. Cause of his renal insufficiency is likely multifactorial and related to his long-standing diabetes (he presents with proteinuria) and retroperitoneal fibrosis.  Patient's renal function will be monitored closely and he will return to the clinic in 1 month for follow up. I will order a renal panel, CBC, urinalysis, protein creatinine ratio, PTH prior to his return visit. Patient was advised to avoid NSAID pain medications such as advil, aleve, motrin, ibuprofen, naprosyn, meloxicam, diclofenac, mobic and to hydrate with 60-80 ounces of water per day. Will not start ACE-I/ARB at present given his well-controlled BP. Will re-address this upon return visit.     2. Electrolytes: Within normal limits.    3. Acid base status: mild acidosis, monitor.     4. Volume: Mild LE edema. Monitor.     5. Hypertension: Good BP control.     6. Medications: Reviewed. Agree with current medical regimen.     7. DM2: continue Victoza.    8. Retroperitoneal fibrosis: he has regular follow-up with Dr. So (Urology)  for ureteral stent changes.        Thank you very much for this consult. Please see my note in Epic for recommendations.    Total time spent was about 45 min. 50 % or more was spend on counseling about treatment options disease etiology. Level 5 consult.

## 2018-09-25 RX ORDER — SIMVASTATIN 40 MG/1
TABLET, FILM COATED ORAL
Qty: 90 TABLET | Refills: 3 | Status: SHIPPED | OUTPATIENT
Start: 2018-09-25 | End: 2018-12-13 | Stop reason: SDUPTHER

## 2018-09-28 ENCOUNTER — OFFICE VISIT (OUTPATIENT)
Dept: INTERNAL MEDICINE | Facility: CLINIC | Age: 63
End: 2018-09-28
Payer: MEDICARE

## 2018-09-28 VITALS
HEART RATE: 82 BPM | BODY MASS INDEX: 32.42 KG/M2 | SYSTOLIC BLOOD PRESSURE: 136 MMHG | HEIGHT: 67 IN | DIASTOLIC BLOOD PRESSURE: 80 MMHG | WEIGHT: 206.56 LBS | OXYGEN SATURATION: 99 % | TEMPERATURE: 100 F | RESPIRATION RATE: 16 BRPM

## 2018-09-28 DIAGNOSIS — L03.311 CELLULITIS, ABDOMINAL WALL: Primary | ICD-10-CM

## 2018-09-28 PROCEDURE — 3079F DIAST BP 80-89 MM HG: CPT | Mod: CPTII,,, | Performed by: INTERNAL MEDICINE

## 2018-09-28 PROCEDURE — 99213 OFFICE O/P EST LOW 20 MIN: CPT | Mod: PBBFAC,PO | Performed by: INTERNAL MEDICINE

## 2018-09-28 PROCEDURE — 3008F BODY MASS INDEX DOCD: CPT | Mod: CPTII,,, | Performed by: INTERNAL MEDICINE

## 2018-09-28 PROCEDURE — 99213 OFFICE O/P EST LOW 20 MIN: CPT | Mod: S$PBB,,, | Performed by: INTERNAL MEDICINE

## 2018-09-28 PROCEDURE — 99999 PR PBB SHADOW E&M-EST. PATIENT-LVL III: CPT | Mod: PBBFAC,,, | Performed by: INTERNAL MEDICINE

## 2018-09-28 PROCEDURE — 3075F SYST BP GE 130 - 139MM HG: CPT | Mod: CPTII,,, | Performed by: INTERNAL MEDICINE

## 2018-09-28 RX ORDER — SULFAMETHOXAZOLE AND TRIMETHOPRIM 800; 160 MG/1; MG/1
1 TABLET ORAL 2 TIMES DAILY
Qty: 20 TABLET | Refills: 0 | Status: SHIPPED | OUTPATIENT
Start: 2018-09-28 | End: 2018-10-17

## 2018-09-28 NOTE — PROGRESS NOTES
"HPI:  Patient is a 63-year-old man who comes today for complaints of is skin infection.  Patient has a history of recurrent cellulitis.  Patient states that he just noticed the right lower abdominal flank area anteriorly was warm and red.  It is slightly uncomfortable as well.  He denies any fever chills or sweats.    Current meds have been verified and updated per the EMR  Exam:/80   Pulse 82   Temp 100 °F (37.8 °C)   Resp 16   Ht 5' 7" (1.702 m)   Wt 93.7 kg (206 lb 9.1 oz)   SpO2 99%   BMI 32.35 kg/m²   He has an area on the right lower abdominal wall/flank area that is approximately 3 x 10 cm in size that is erythematous and warm.  Absolutely looks like cellulitis.    Lab Results   Component Value Date    WBC 8.19 04/24/2018    HGB 9.4 (L) 04/24/2018    HCT 30.4 (L) 04/24/2018     (H) 04/24/2018    CHOL 125 07/26/2018    TRIG 314 (H) 07/26/2018    HDL 36 (L) 07/26/2018    ALT 13 07/26/2018    AST 22 07/26/2018     07/26/2018    K 4.3 07/26/2018     07/26/2018    CREATININE 2.9 (H) 07/26/2018    BUN 44 (H) 07/26/2018    CO2 20 (L) 07/26/2018    TSH 1.948 04/24/2018    PSA 0.13 08/21/2014    HGBA1C 8.8 (H) 07/26/2018       Impression:  Cellulitis, abdominal wall  Patient Active Problem List   Diagnosis    Calculus of kidney    History of colon cancer    Retroperitoneal fibrosis    DM type 2 with diabetic mixed hyperlipidemia    Colostomy status    Hypertension associated with diabetes    DM (diabetes mellitus), type 2 with complications    History of gout    Anemia in CKD (chronic kidney disease)    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  He should use warm compresses.  If it is not better in " 48-72 hours, he needs to call me

## 2018-10-10 ENCOUNTER — LAB VISIT (OUTPATIENT)
Dept: LAB | Facility: HOSPITAL | Age: 63
End: 2018-10-10
Attending: INTERNAL MEDICINE
Payer: MEDICARE

## 2018-10-10 ENCOUNTER — IMMUNIZATION (OUTPATIENT)
Dept: INTERNAL MEDICINE | Facility: CLINIC | Age: 63
End: 2018-10-10
Payer: MEDICARE

## 2018-10-10 DIAGNOSIS — N18.4 CKD (CHRONIC KIDNEY DISEASE) STAGE 4, GFR 15-29 ML/MIN: ICD-10-CM

## 2018-10-10 LAB
BASOPHILS # BLD AUTO: 0.06 K/UL
BASOPHILS NFR BLD: 0.6 %
DIFFERENTIAL METHOD: ABNORMAL
EOSINOPHIL # BLD AUTO: 0.5 K/UL
EOSINOPHIL NFR BLD: 5.1 %
ERYTHROCYTE [DISTWIDTH] IN BLOOD BY AUTOMATED COUNT: 15.6 %
HCT VFR BLD AUTO: 33.5 %
HGB BLD-MCNC: 10.5 G/DL
IMM GRANULOCYTES # BLD AUTO: 0.04 K/UL
IMM GRANULOCYTES NFR BLD AUTO: 0.4 %
LYMPHOCYTES # BLD AUTO: 2.2 K/UL
LYMPHOCYTES NFR BLD: 23.2 %
MCH RBC QN AUTO: 27.3 PG
MCHC RBC AUTO-ENTMCNC: 31.3 G/DL
MCV RBC AUTO: 87 FL
MONOCYTES # BLD AUTO: 0.5 K/UL
MONOCYTES NFR BLD: 5.2 %
NEUTROPHILS # BLD AUTO: 6.3 K/UL
NEUTROPHILS NFR BLD: 65.5 %
NRBC BLD-RTO: 0 /100 WBC
PLATELET # BLD AUTO: 437 K/UL
PMV BLD AUTO: 9.9 FL
PTH-INTACT SERPL-MCNC: 18 PG/ML
RBC # BLD AUTO: 3.84 M/UL
WBC # BLD AUTO: 9.61 K/UL

## 2018-10-10 PROCEDURE — 90662 IIV NO PRSV INCREASED AG IM: CPT | Mod: PBBFAC,PO

## 2018-10-10 PROCEDURE — 99213 OFFICE O/P EST LOW 20 MIN: CPT | Mod: PBBFAC,PO,25

## 2018-10-10 PROCEDURE — 36415 COLL VENOUS BLD VENIPUNCTURE: CPT | Mod: PO

## 2018-10-10 PROCEDURE — 85025 COMPLETE CBC W/AUTO DIFF WBC: CPT

## 2018-10-10 PROCEDURE — 99999 PR PBB SHADOW E&M-EST. PATIENT-LVL III: CPT | Mod: PBBFAC,,,

## 2018-10-10 PROCEDURE — 83970 ASSAY OF PARATHORMONE: CPT

## 2018-10-10 PROCEDURE — 80069 RENAL FUNCTION PANEL: CPT

## 2018-10-10 NOTE — PROGRESS NOTES
Fluzone high dose administered.  See immunization record.  Pt advised to wait in clinic 15 minutes to monitor for reactions.  Pt voiced understanding and tolerated injection well.

## 2018-10-11 LAB
ALBUMIN SERPL BCP-MCNC: 3.4 G/DL
ANION GAP SERPL CALC-SCNC: 10 MMOL/L
BUN SERPL-MCNC: 29 MG/DL
CALCIUM SERPL-MCNC: 10 MG/DL
CHLORIDE SERPL-SCNC: 100 MMOL/L
CO2 SERPL-SCNC: 21 MMOL/L
CREAT SERPL-MCNC: 2.4 MG/DL
EST. GFR  (AFRICAN AMERICAN): 31.8 ML/MIN/1.73 M^2
EST. GFR  (NON AFRICAN AMERICAN): 27.5 ML/MIN/1.73 M^2
GLUCOSE SERPL-MCNC: 176 MG/DL
PHOSPHATE SERPL-MCNC: 3.3 MG/DL
POTASSIUM SERPL-SCNC: 4.5 MMOL/L
SODIUM SERPL-SCNC: 131 MMOL/L

## 2018-10-17 ENCOUNTER — TELEPHONE (OUTPATIENT)
Dept: NEPHROLOGY | Facility: CLINIC | Age: 63
End: 2018-10-17

## 2018-10-17 ENCOUNTER — OFFICE VISIT (OUTPATIENT)
Dept: NEPHROLOGY | Facility: CLINIC | Age: 63
End: 2018-10-17
Payer: MEDICARE

## 2018-10-17 VITALS
BODY MASS INDEX: 34.01 KG/M2 | HEART RATE: 88 BPM | SYSTOLIC BLOOD PRESSURE: 150 MMHG | WEIGHT: 211.63 LBS | DIASTOLIC BLOOD PRESSURE: 80 MMHG | HEIGHT: 66 IN

## 2018-10-17 DIAGNOSIS — E11.29 PROTEINURIA DUE TO TYPE 2 DIABETES MELLITUS: ICD-10-CM

## 2018-10-17 DIAGNOSIS — N18.4 CKD (CHRONIC KIDNEY DISEASE) STAGE 4, GFR 15-29 ML/MIN: ICD-10-CM

## 2018-10-17 DIAGNOSIS — R80.9 PROTEINURIA, UNSPECIFIED TYPE: Primary | ICD-10-CM

## 2018-10-17 DIAGNOSIS — R80.9 PROTEINURIA DUE TO TYPE 2 DIABETES MELLITUS: ICD-10-CM

## 2018-10-17 PROCEDURE — 3045F PR MOST RECENT HEMOGLOBIN A1C LEVEL 7.0-9.0%: CPT | Mod: CPTII,,, | Performed by: INTERNAL MEDICINE

## 2018-10-17 PROCEDURE — 99214 OFFICE O/P EST MOD 30 MIN: CPT | Mod: S$PBB,,, | Performed by: INTERNAL MEDICINE

## 2018-10-17 PROCEDURE — 3079F DIAST BP 80-89 MM HG: CPT | Mod: CPTII,,, | Performed by: INTERNAL MEDICINE

## 2018-10-17 PROCEDURE — 99999 PR PBB SHADOW E&M-EST. PATIENT-LVL III: CPT | Mod: PBBFAC,,, | Performed by: INTERNAL MEDICINE

## 2018-10-17 PROCEDURE — 99213 OFFICE O/P EST LOW 20 MIN: CPT | Mod: PBBFAC | Performed by: INTERNAL MEDICINE

## 2018-10-17 PROCEDURE — 3008F BODY MASS INDEX DOCD: CPT | Mod: CPTII,,, | Performed by: INTERNAL MEDICINE

## 2018-10-17 PROCEDURE — 3077F SYST BP >= 140 MM HG: CPT | Mod: CPTII,,, | Performed by: INTERNAL MEDICINE

## 2018-10-17 RX ORDER — LISINOPRIL 2.5 MG/1
2.5 TABLET ORAL DAILY
Qty: 90 TABLET | Refills: 3 | Status: SHIPPED | OUTPATIENT
Start: 2018-10-17 | End: 2019-04-24 | Stop reason: SINTOL

## 2018-10-17 RX ORDER — CEFDINIR 300 MG/1
300 CAPSULE ORAL 2 TIMES DAILY
COMMUNITY
End: 2019-02-08

## 2018-10-17 NOTE — TELEPHONE ENCOUNTER
----- Message from Graciela Hernandez sent at 10/17/2018 11:49 AM CDT -----  Contact: pt  Please call pt @ [phone], pt states he was in clinic this morning, pt have questions for nurse, states he been waiting on call.

## 2018-10-17 NOTE — TELEPHONE ENCOUNTER
Informed patient to stop bactrim and only take cefdinir once a day. Patient verbalized understanding.

## 2018-10-17 NOTE — PROGRESS NOTES
PROGRESS NOTE FOR ESTABLISHED PATIENT    PHYSICIAN REQUESTING THE CONSULT: Dr. Octavio Rouse    REASON FOR CONSULTATION: Renal insufficiency    63 y.o. male with history of retroperitoneal fibrosis, HTN, gout, colon cancer, DM2, nephrolithiasis, anemia, incontinence presents to the renal clinic for evaluation of renal insufficiency.     Patient today reports that he was started on Cefdinir for UTI by Dr. So (Urology). No other issues at present.           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     DM type 2 with diabetic mixed hyperlipidemia     History of colon cancer 2005    History of gout     Hyperlipidemia associated with type 2 diabetes mellitus     Hypertension associated with diabetes     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       Past Surgical History:   Procedure Laterality Date    s/p exp lap times three      complications of colon cancer surgery    S/P partial coloectomy      with colostomy d/t colon cancer       Review of patient's allergies indicates:   Allergen Reactions    Hydrocodone-acetaminophen      Other reaction(s): Rash    Metformin      Advised no metformin due to kidneys    Diazepam Itching and Rash     Other reaction(s): Itching  Other reaction(s): Rash    Morphine Rash     Other reaction(s): Rash  Other reaction(s): Rash  Other reaction(s): Rash    Propoxyphene n-acetaminophen Rash     Other reaction(s): Rash    Propoxyphene-acetaminophen Rash       Current Outpatient Medications   Medication Sig Dispense Refill    allopurinol (ZYLOPRIM) 300 MG tablet Take 1 tablet (300 mg total) by mouth once daily. 90 tablet 3    aspirin 81 MG Chew Take 81 mg by mouth daily as  "needed.       blood sugar diagnostic (TRUETEST TEST STRIPS) Strp 1 strip by Misc.(Non-Drug; Combo Route) route 2 (two) times daily. 200 strip 3    colchicine 0.6 mg tablet Take 0.6 mg by mouth daily as needed.       fenofibrate 160 MG Tab TAKE ONE TABLET BY MOUTH ONCE DAILY IN THE MORNING 30 tablet 11    ferrous sulfate 134 mg (27 mg iron) Tab Take 1 tablet by mouth 2 (two) times daily.      glimepiride (AMARYL) 4 MG tablet Take 1 tablet (4 mg total) by mouth before breakfast. 90 tablet 3    hydrochlorothiazide (HYDRODIURIL) 25 MG tablet Take 1 tablet (25 mg total) by mouth once daily. 1 Tablet Oral Every day 30 tablet 11    hydrOXYzine pamoate (VISTARIL) 25 MG Cap Take 1 capsule (25 mg total) by mouth every 8 (eight) hours as needed. 90 capsule 11    hyoscyamine (LEVSIN/SL) 0.125 mg Subl       lactobacillus combination no.9 (ADULT 50+ PROBIOTIC) 4 billion cell Cap Take by mouth.      liraglutide 0.6 mg/0.1 mL, 18 mg/3 mL, subq PNIJ (VICTOZA 3-KIRBY) 0.6 mg/0.1 mL (18 mg/3 mL) PnIj Inject 1.8 mg into the skin once daily. 9 mL 11    loperamide (IMODIUM A-D) 2 mg Tab Take 2 mg by mouth 4 (four) times daily as needed.      metoprolol succinate (TOPROL-XL) 100 MG 24 hr tablet Take 1 tablet (100 mg total) by mouth once daily. 30 tablet 11    multivitamin (THERAGRAN) per tablet Take by mouth. 1 Tablet Oral Every day      niacin 500 MG Tab Take 100 mg by mouth 2 (two) times daily with meals.       nystatin-triamcinolone (MYCOLOG II) cream       pen needle, diabetic 33 gauge x 5/32" Ndle 1 Units by Misc.(Non-Drug; Combo Route) route once daily. 100 each 3    promethazine (PHENERGAN) 25 MG tablet Take 1 tablet (25 mg total) by mouth every 6 (six) hours as needed for Nausea. 30 tablet 5    simvastatin (ZOCOR) 40 MG tablet TAKE ONE TABLET BY MOUTH ONCE DAILY IN THE EVENING 90 tablet 0    simvastatin (ZOCOR) 40 MG tablet TAKE 1 TABLET BY MOUTH ONCE DAILY IN THE EVENING 90 tablet 3    " sulfamethoxazole-trimethoprim 800-160mg (BACTRIM DS) 800-160 mg Tab Take 1 tablet by mouth 2 (two) times daily. 20 tablet 0    tamsulosin (FLOMAX) 0.4 mg Cp24 Take by mouth. 1 Capsule, Ext Release 24 hr Oral At bedtime      tiZANidine (ZANAFLEX) 2 MG tablet Take 1 tablet (2 mg total) by mouth 6 (six) times daily. 1 Capsule Oral Every day 360 tablet 0     No current facility-administered medications for this visit.        Family History   Problem Relation Age of Onset    Diabetes Mother        Social History     Socioeconomic History    Marital status:      Spouse name: Not on file    Number of children: Not on file    Years of education: Not on file    Highest education level: Not on file   Social Needs    Financial resource strain: Not on file    Food insecurity - worry: Not on file    Food insecurity - inability: Not on file    Transportation needs - medical: Not on file    Transportation needs - non-medical: Not on file   Occupational History    Not on file   Tobacco Use    Smoking status: Former Smoker     Packs/day: 2.50     Years: 35.00     Pack years: 87.50     Types: Cigarettes     Last attempt to quit: 2005     Years since quittin.2    Smokeless tobacco: Never Used   Substance and Sexual Activity    Alcohol use: No    Drug use: No    Sexual activity: No   Other Topics Concern    Not on file   Social History Narrative    Not on file       Review of Systems:  1. GENERAL: patient denies any fever, weight changes, generalized weakness, dizziness.  2. HEENT: patient denies headaches, visual disturbances, swallowing problems, sinus pain, nasal congestion.  3. CARDIOVASCULAR: patient denies chest pain, palpitations.  4. PULMONARY: patient denies SOB, coughing, hemoptysis, wheezing.  5. GASTROINTESTINAL: patient denies abdominal pain, nausea, vomiting, diarrhea, colostomy bag in place  6. GENITOURINARY: patient denies dysuria, he reports intermittent hematuria, no hesitancy,  "frequency.  7. EXTREMITIES: patient denies LE edema, LE cramping.  8. DERMATOLOGY: patient denies rashes, ulcers.  9. NEURO: patient denies tremors, extremity weakness, extremity numbness/tingling.  10. MUSCULOSKELETAL: patient denies joint pain, joint swelling.  11. HEMATOLOGY: patient denies rectal or gum bleeding.  12: PSYCH: patient denies anxiety, depression.      PHYSICAL EXAM:    BP (!) 150/80   Pulse 88   Ht 5' 6" (1.676 m)   Wt 96 kg (211 lb 10.3 oz)   BMI 34.16 kg/m²     GENERAL: Pleasant gentleman presents to clinic with non-labored breathing.  HEENT: PER, no nasal discharge, no icterus, no oral exudates, moist mucosal membranes, bilateral shoulder fat pads  NECK: no thyroid mass, no lymphadenopathy.  HEART: RRR S1/S2, no rubs, good peripheral pulses.  LUNGS: CTA bilaterally, no wheezing, breathing is nonlabored.  ABDOMEN: soft, nontender, not distended, bowel sounds are present, no abdominal hernia, right sided colostomy (with bag)  EXTREM: mild bilateral ankle edema.  SKIN: no rashes, skin is warm and dry.  NEURO: A & O x 3, no obvious focal signs.    LABORATORY RESULTS:    Lab Results   Component Value Date    CREATININE 2.4 (H) 10/10/2018    BUN 29 (H) 10/10/2018     (L) 10/10/2018    K 4.5 10/10/2018     10/10/2018    CO2 21 (L) 10/10/2018      Lab Results   Component Value Date    PTH 18.0 10/10/2018    CALCIUM 10.0 10/10/2018    PHOS 3.3 10/10/2018     Lab Results   Component Value Date    ALBUMIN 3.4 (L) 10/10/2018     Lab Results   Component Value Date    WBC 9.61 10/10/2018    HGB 10.5 (L) 10/10/2018    HCT 33.5 (L) 10/10/2018    MCV 87 10/10/2018     (H) 10/10/2018     Protein Creatinine Ratios:    Creatinine, Random Ur   Date Value Ref Range Status   10/10/2018 113.0 23.0 - 375.0 mg/dL Final     Comment:     The random urine reference ranges provided were established   for 24 hour urine collections.  No reference ranges exist for  random urine specimens.  Correlate " clinically.     11/20/2017 94.0 23.0 - 375.0 mg/dL Final     Comment:     The random urine reference ranges provided were established   for 24 hour urine collections.  No reference ranges exist for  random urine specimens.  Correlate clinically.     07/28/2016 143.0 23.0 - 375.0 mg/dL Final     Comment:     The random urine reference ranges provided were established   for 24 hour urine collections.  No reference ranges exist for  random urine specimens.  Correlate clinically.       Protein, Urine Random   Date Value Ref Range Status   10/10/2018 814 (H) 0 - 15 mg/dL Final     Comment:     The random urine reference ranges provided were established   for 24 hour urine collections.  No reference ranges exist for  random urine specimens.  Correlate clinically.     11/20/2017 188 (H) 0 - 15 mg/dL Final     Comment:     The random urine reference ranges provided were established   for 24 hour urine collections.  No reference ranges exist for  random urine specimens.  Correlate clinically.     07/28/2016 381 (H) 0 - 15 mg/dL Final     Comment:     The random urine reference ranges provided were established   for 24 hour urine collections.  No reference ranges exist for  random urine specimens.  Correlate clinically.       Prot/Creat Ratio, Ur   Date Value Ref Range Status   10/10/2018 7.20 (H) 0.00 - 0.20 Final   11/20/2017 2.00 (H) 0.00 - 0.20 Final   07/28/2016 2.66 (H) 0.00 - 0.20 Final           ASSESSMENT AND PLAN:  63 y.o. male with history of retroperitoneal fibrosis, HTN, gout, colon cancer, DM2, nephrolithiasis, anemia, incontinence presents to the renal clinic for evaluation of renal insufficiency.    1. Renal insufficiency: Patient presents with renal insufficiency, consistent with CKD stage 4. Cause of his renal insufficiency is likely multifactorial and related to his long-standing diabetes (he presents with proteinuria) and retroperitoneal fibrosis.  Patient's renal function has improved since last visit  with creatinine declining to 2.4. Proteinuria has worsened and Lisinopril will be added. Patient was advised to avoid NSAID pain medications such as advil, aleve, motrin, ibuprofen, naprosyn, meloxicam, diclofenac, mobic and to hydrate with 60-80 ounces of water per day.     2. Electrolytes: mild hyponatremia. Monitor for now.     3. Acid base status: mild acidosis, monitor.     4. Volume: Mild LE edema. Monitor.     5. Hypertension: BP mi;lly elevated. Will add lisinopril 2.5 mg daily.     6. Medications: Reviewed. Lisinopril was added. Decrease Cefdinir dose to 1 pill per day.     7. DM2: continue Victoza.    8. Retroperitoneal fibrosis: he has regular follow-up with Dr. So (Urology) for ureteral stent changes.      9. UTI: Cefdinir as per Dr. So.

## 2018-11-12 ENCOUNTER — TELEPHONE (OUTPATIENT)
Dept: INTERNAL MEDICINE | Facility: CLINIC | Age: 63
End: 2018-11-12

## 2018-11-12 NOTE — TELEPHONE ENCOUNTER
Called pt to confirm appointment.  Pt cancelled appointment and will call back to reschedule.   Pt reports fever and has an appointment today to follow up with Dr. So.

## 2018-12-05 ENCOUNTER — LAB VISIT (OUTPATIENT)
Dept: LAB | Facility: HOSPITAL | Age: 63
End: 2018-12-05
Attending: INTERNAL MEDICINE
Payer: MEDICARE

## 2018-12-05 DIAGNOSIS — N18.4 CKD (CHRONIC KIDNEY DISEASE) STAGE 4, GFR 15-29 ML/MIN: ICD-10-CM

## 2018-12-05 LAB
ALBUMIN SERPL BCP-MCNC: 3 G/DL
ANION GAP SERPL CALC-SCNC: 11 MMOL/L
BUN SERPL-MCNC: 49 MG/DL
CALCIUM SERPL-MCNC: 10.5 MG/DL
CHLORIDE SERPL-SCNC: 103 MMOL/L
CO2 SERPL-SCNC: 22 MMOL/L
CREAT SERPL-MCNC: 2.8 MG/DL
EST. GFR  (AFRICAN AMERICAN): 26.4 ML/MIN/1.73 M^2
EST. GFR  (NON AFRICAN AMERICAN): 22.8 ML/MIN/1.73 M^2
GLUCOSE SERPL-MCNC: 203 MG/DL
PHOSPHATE SERPL-MCNC: 4 MG/DL
POTASSIUM SERPL-SCNC: 4.1 MMOL/L
SODIUM SERPL-SCNC: 136 MMOL/L

## 2018-12-05 PROCEDURE — 36415 COLL VENOUS BLD VENIPUNCTURE: CPT | Mod: PO

## 2018-12-05 PROCEDURE — 80069 RENAL FUNCTION PANEL: CPT

## 2018-12-13 ENCOUNTER — OFFICE VISIT (OUTPATIENT)
Dept: NEPHROLOGY | Facility: CLINIC | Age: 63
End: 2018-12-13
Payer: MEDICARE

## 2018-12-13 VITALS
BODY MASS INDEX: 33.1 KG/M2 | HEART RATE: 90 BPM | HEIGHT: 66 IN | DIASTOLIC BLOOD PRESSURE: 70 MMHG | WEIGHT: 205.94 LBS | SYSTOLIC BLOOD PRESSURE: 128 MMHG

## 2018-12-13 DIAGNOSIS — N18.4 CKD (CHRONIC KIDNEY DISEASE) STAGE 4, GFR 15-29 ML/MIN: Primary | ICD-10-CM

## 2018-12-13 DIAGNOSIS — E11.29 PROTEINURIA DUE TO TYPE 2 DIABETES MELLITUS: ICD-10-CM

## 2018-12-13 DIAGNOSIS — R80.9 PROTEINURIA DUE TO TYPE 2 DIABETES MELLITUS: ICD-10-CM

## 2018-12-13 PROCEDURE — 99214 OFFICE O/P EST MOD 30 MIN: CPT | Mod: S$GLB,,, | Performed by: INTERNAL MEDICINE

## 2018-12-13 PROCEDURE — 3074F SYST BP LT 130 MM HG: CPT | Mod: CPTII,S$GLB,, | Performed by: INTERNAL MEDICINE

## 2018-12-13 PROCEDURE — 3078F DIAST BP <80 MM HG: CPT | Mod: CPTII,S$GLB,, | Performed by: INTERNAL MEDICINE

## 2018-12-13 PROCEDURE — 99999 PR PBB SHADOW E&M-EST. PATIENT-LVL III: CPT | Mod: PBBFAC,,, | Performed by: INTERNAL MEDICINE

## 2018-12-13 PROCEDURE — 3008F BODY MASS INDEX DOCD: CPT | Mod: CPTII,S$GLB,, | Performed by: INTERNAL MEDICINE

## 2018-12-13 PROCEDURE — 3045F PR MOST RECENT HEMOGLOBIN A1C LEVEL 7.0-9.0%: CPT | Mod: CPTII,S$GLB,, | Performed by: INTERNAL MEDICINE

## 2018-12-13 NOTE — PROGRESS NOTES
PROGRESS NOTE FOR ESTABLISHED PATIENT    PHYSICIAN REQUESTING THE CONSULT: Dr. Octavio Rouse    REASON FOR CONSULTATION: Renal insufficiency    63 y.o. male with history of retroperitoneal fibrosis, HTN, gout, colon cancer, DM2, nephrolithiasis, anemia, incontinence presents to the renal clinic for evaluation of renal insufficiency.     Patient today reports that he was started on Cefdinir for UTI by Dr. So (Urology) for likely UTI. He has to take 3 more pills. No complaints today.          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     DM type 2 with diabetic mixed hyperlipidemia     History of colon cancer 2005    History of gout     Hyperlipidemia associated with type 2 diabetes mellitus     Hypertension associated with diabetes     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       Past Surgical History:   Procedure Laterality Date    s/p exp lap times three      complications of colon cancer surgery    S/P partial coloectomy      with colostomy d/t colon cancer       Review of patient's allergies indicates:   Allergen Reactions    Hydrocodone-acetaminophen      Other reaction(s): Rash    Metformin      Advised no metformin due to kidneys    Diazepam Itching and Rash     Other reaction(s): Itching  Other reaction(s): Rash    Morphine Rash     Other reaction(s): Rash  Other reaction(s): Rash  Other reaction(s): Rash    Propoxyphene n-acetaminophen Rash     Other reaction(s): Rash    Propoxyphene-acetaminophen Rash       Current Outpatient Medications   Medication Sig Dispense Refill    allopurinol (ZYLOPRIM) 300 MG tablet Take 1 tablet (300 mg total) by mouth once daily. 90 tablet 3    aspirin 81 MG  "Chew Take 81 mg by mouth daily as needed.       blood sugar diagnostic (TRUETEST TEST STRIPS) Strp 1 strip by Misc.(Non-Drug; Combo Route) route 2 (two) times daily. 200 strip 3    cefdinir (OMNICEF) 300 MG capsule Take 300 mg by mouth 2 (two) times daily.      colchicine 0.6 mg tablet Take 0.6 mg by mouth daily as needed.       fenofibrate 160 MG Tab TAKE ONE TABLET BY MOUTH ONCE DAILY IN THE MORNING 30 tablet 11    ferrous sulfate 134 mg (27 mg iron) Tab Take 1 tablet by mouth 2 (two) times daily.      glimepiride (AMARYL) 4 MG tablet Take 1 tablet (4 mg total) by mouth before breakfast. 90 tablet 3    hydrochlorothiazide (HYDRODIURIL) 25 MG tablet Take 1 tablet (25 mg total) by mouth once daily. 1 Tablet Oral Every day 30 tablet 11    hydrOXYzine pamoate (VISTARIL) 25 MG Cap Take 1 capsule (25 mg total) by mouth every 8 (eight) hours as needed. 90 capsule 11    hyoscyamine (LEVSIN/SL) 0.125 mg Subl       lactobacillus combination no.9 (ADULT 50+ PROBIOTIC) 4 billion cell Cap Take by mouth.      liraglutide 0.6 mg/0.1 mL, 18 mg/3 mL, subq PNIJ (VICTOZA 3-KIRBY) 0.6 mg/0.1 mL (18 mg/3 mL) PnIj Inject 1.8 mg into the skin once daily. 9 mL 11    lisinopril (PRINIVIL,ZESTRIL) 2.5 MG tablet Take 1 tablet (2.5 mg total) by mouth once daily. 90 tablet 3    loperamide (IMODIUM A-D) 2 mg Tab Take 2 mg by mouth 4 (four) times daily as needed.      metoprolol succinate (TOPROL-XL) 100 MG 24 hr tablet Take 1 tablet (100 mg total) by mouth once daily. 30 tablet 11    multivitamin (THERAGRAN) per tablet Take by mouth. 1 Tablet Oral Every day      niacin 500 MG Tab Take 100 mg by mouth 2 (two) times daily with meals.       nystatin-triamcinolone (MYCOLOG II) cream       pen needle, diabetic 33 gauge x 5/32" Ndle 1 Units by Misc.(Non-Drug; Combo Route) route once daily. 100 each 3    promethazine (PHENERGAN) 25 MG tablet Take 1 tablet (25 mg total) by mouth every 6 (six) hours as needed for Nausea. 30 tablet 5 "    simvastatin (ZOCOR) 40 MG tablet TAKE ONE TABLET BY MOUTH ONCE DAILY IN THE EVENING 90 tablet 0    tamsulosin (FLOMAX) 0.4 mg Cp24 Take by mouth. 1 Capsule, Ext Release 24 hr Oral At bedtime      tiZANidine (ZANAFLEX) 2 MG tablet Take 1 tablet (2 mg total) by mouth 6 (six) times daily. 1 Capsule Oral Every day 360 tablet 0     No current facility-administered medications for this visit.        Family History   Problem Relation Age of Onset    Diabetes Mother        Social History     Socioeconomic History    Marital status:      Spouse name: Not on file    Number of children: Not on file    Years of education: Not on file    Highest education level: Not on file   Social Needs    Financial resource strain: Not on file    Food insecurity - worry: Not on file    Food insecurity - inability: Not on file    Transportation needs - medical: Not on file    Transportation needs - non-medical: Not on file   Occupational History    Not on file   Tobacco Use    Smoking status: Former Smoker     Packs/day: 2.50     Years: 35.00     Pack years: 87.50     Types: Cigarettes     Last attempt to quit: 2005     Years since quittin.4    Smokeless tobacco: Never Used   Substance and Sexual Activity    Alcohol use: No    Drug use: No    Sexual activity: No   Other Topics Concern    Not on file   Social History Narrative    Not on file       Review of Systems:  1. GENERAL: patient denies any fever, weight changes, generalized weakness, dizziness.  2. HEENT: patient denies headaches, visual disturbances, swallowing problems, sinus pain, nasal congestion.  3. CARDIOVASCULAR: patient denies chest pain, palpitations.  4. PULMONARY: patient denies SOB, coughing, hemoptysis, wheezing.  5. GASTROINTESTINAL: patient denies abdominal pain, nausea, vomiting, diarrhea, colostomy bag in place  6. GENITOURINARY: patient denies dysuria, he reports intermittent hematuria, no hesitancy, frequency.  7. EXTREMITIES:  "patient denies LE edema, LE cramping.  8. DERMATOLOGY: patient denies rashes, ulcers.  9. NEURO: patient denies tremors, extremity weakness, extremity numbness/tingling.  10. MUSCULOSKELETAL: patient denies joint pain, joint swelling.  11. HEMATOLOGY: patient denies rectal or gum bleeding.  12: PSYCH: patient denies anxiety, depression.      PHYSICAL EXAM:    /70   Pulse 90   Ht 5' 6" (1.676 m)   Wt 93.4 kg (205 lb 14.6 oz)   BMI 33.23 kg/m²     GENERAL: Pleasant gentleman presents to clinic with non-labored breathing.  HEENT: PER, no nasal discharge, no icterus, no oral exudates, moist mucosal membranes, bilateral shoulder fat pads  NECK: no thyroid mass, no lymphadenopathy.  HEART: RRR S1/S2, no rubs, good peripheral pulses.  LUNGS: CTA bilaterally, no wheezing, breathing is nonlabored.  ABDOMEN: soft, nontender, not distended, bowel sounds are present, no abdominal hernia, right sided colostomy (with bag)  EXTREM: mild bilateral ankle edema.  SKIN: no rashes, skin is warm and dry.  NEURO: A & O x 3, no obvious focal signs.    LABORATORY RESULTS:    Lab Results   Component Value Date    CREATININE 2.8 (H) 12/05/2018    BUN 49 (H) 12/05/2018     12/05/2018    K 4.1 12/05/2018     12/05/2018    CO2 22 (L) 12/05/2018      Lab Results   Component Value Date    PTH 18.0 10/10/2018    CALCIUM 10.5 12/05/2018    PHOS 4.0 12/05/2018     Lab Results   Component Value Date    ALBUMIN 3.0 (L) 12/05/2018     Lab Results   Component Value Date    WBC 9.61 10/10/2018    HGB 10.5 (L) 10/10/2018    HCT 33.5 (L) 10/10/2018    MCV 87 10/10/2018     (H) 10/10/2018     Protein Creatinine Ratios:    Creatinine, Random Ur   Date Value Ref Range Status   12/05/2018 91.0 23.0 - 375.0 mg/dL Final     Comment:     The random urine reference ranges provided were established   for 24 hour urine collections.  No reference ranges exist for  random urine specimens.  Correlate clinically.     10/10/2018 113.0 23.0 - " 375.0 mg/dL Final     Comment:     The random urine reference ranges provided were established   for 24 hour urine collections.  No reference ranges exist for  random urine specimens.  Correlate clinically.     11/20/2017 94.0 23.0 - 375.0 mg/dL Final     Comment:     The random urine reference ranges provided were established   for 24 hour urine collections.  No reference ranges exist for  random urine specimens.  Correlate clinically.       Protein, Urine Random   Date Value Ref Range Status   12/05/2018 341 (H) 0 - 15 mg/dL Final     Comment:     The random urine reference ranges provided were established   for 24 hour urine collections.  No reference ranges exist for  random urine specimens.  Correlate clinically.     10/10/2018 814 (H) 0 - 15 mg/dL Final     Comment:     The random urine reference ranges provided were established   for 24 hour urine collections.  No reference ranges exist for  random urine specimens.  Correlate clinically.     11/20/2017 188 (H) 0 - 15 mg/dL Final     Comment:     The random urine reference ranges provided were established   for 24 hour urine collections.  No reference ranges exist for  random urine specimens.  Correlate clinically.       Prot/Creat Ratio, Ur   Date Value Ref Range Status   12/05/2018 3.75 (H) 0.00 - 0.20 Final   10/10/2018 7.20 (H) 0.00 - 0.20 Final   11/20/2017 2.00 (H) 0.00 - 0.20 Final           ASSESSMENT AND PLAN:  63 y.o. male with history of retroperitoneal fibrosis, HTN, gout, colon cancer, DM2, nephrolithiasis, anemia, incontinence presents to the renal clinic for evaluation of renal insufficiency.    1. Renal insufficiency: Patient presents with renal insufficiency, consistent with CKD stage 4. Last creatinine was 2.8. Cause of his renal insufficiency is likely multifactorial and related to his long-standing diabetes (he presents with proteinuria) and retroperitoneal fibrosis.  Proteinuria has improved after Lisinopril start. Patient was advised to  avoid NSAID pain medications such as advil, aleve, motrin, ibuprofen, naprosyn, meloxicam, diclofenac, mobic and to hydrate with 60-80 ounces of water per day.     2. Electrolytes: borderline hypercalcemia. Patient was asked to stop multivitamins and Tums.     3. Acid base status: mild acidosis, monitor.     4. Volume: Mild LE edema. Monitor.     5. Hypertension: good BP control.     6. Medications: Reviewed. Finish Cefdinir course.     7. DM2: continue Victoza.    8. Retroperitoneal fibrosis: he has regular follow-up with Dr. So (Urology) for ureteral stent changes.      9. UTI: Cefdinir as per Dr. So.

## 2018-12-27 RX ORDER — ALLOPURINOL 300 MG/1
TABLET ORAL
Qty: 90 TABLET | Refills: 3 | Status: SHIPPED | OUTPATIENT
Start: 2018-12-27

## 2018-12-29 RX ORDER — METOPROLOL SUCCINATE 100 MG/1
TABLET, EXTENDED RELEASE ORAL
Qty: 30 TABLET | Refills: 11 | Status: SHIPPED | OUTPATIENT
Start: 2018-12-29 | End: 2019-05-27 | Stop reason: SDUPTHER

## 2019-01-03 ENCOUNTER — LAB VISIT (OUTPATIENT)
Dept: LAB | Facility: HOSPITAL | Age: 64
End: 2019-01-03
Attending: INTERNAL MEDICINE
Payer: MEDICARE

## 2019-01-03 DIAGNOSIS — E11.22 TYPE 2 DIABETES MELLITUS WITH STAGE 3 CHRONIC KIDNEY DISEASE, WITHOUT LONG-TERM CURRENT USE OF INSULIN: ICD-10-CM

## 2019-01-03 DIAGNOSIS — N18.30 TYPE 2 DIABETES MELLITUS WITH STAGE 3 CHRONIC KIDNEY DISEASE, WITHOUT LONG-TERM CURRENT USE OF INSULIN: ICD-10-CM

## 2019-01-03 LAB
ANION GAP SERPL CALC-SCNC: 11 MMOL/L
BUN SERPL-MCNC: 41 MG/DL
CALCIUM SERPL-MCNC: 9.4 MG/DL
CHLORIDE SERPL-SCNC: 105 MMOL/L
CHOLEST SERPL-MCNC: 126 MG/DL
CHOLEST/HDLC SERPL: 2.9 {RATIO}
CO2 SERPL-SCNC: 20 MMOL/L
CREAT SERPL-MCNC: 3 MG/DL
EST. GFR  (AFRICAN AMERICAN): 24.3 ML/MIN/1.73 M^2
EST. GFR  (NON AFRICAN AMERICAN): 21 ML/MIN/1.73 M^2
ESTIMATED AVG GLUCOSE: 243 MG/DL
GLUCOSE SERPL-MCNC: 218 MG/DL
HBA1C MFR BLD HPLC: 10.1 %
HDLC SERPL-MCNC: 43 MG/DL
HDLC SERPL: 34.1 %
LDLC SERPL CALC-MCNC: 50.6 MG/DL
NONHDLC SERPL-MCNC: 83 MG/DL
POTASSIUM SERPL-SCNC: 4.4 MMOL/L
SODIUM SERPL-SCNC: 136 MMOL/L
TRIGL SERPL-MCNC: 162 MG/DL

## 2019-01-03 PROCEDURE — 36415 COLL VENOUS BLD VENIPUNCTURE: CPT | Mod: PO

## 2019-01-03 PROCEDURE — 80048 BASIC METABOLIC PNL TOTAL CA: CPT

## 2019-01-03 PROCEDURE — 83036 HEMOGLOBIN GLYCOSYLATED A1C: CPT

## 2019-01-03 PROCEDURE — 80061 LIPID PANEL: CPT

## 2019-01-04 ENCOUNTER — TELEPHONE (OUTPATIENT)
Dept: INTERNAL MEDICINE | Facility: CLINIC | Age: 64
End: 2019-01-04

## 2019-01-04 RX ORDER — METOPROLOL SUCCINATE 100 MG/1
100 TABLET, EXTENDED RELEASE ORAL DAILY
Qty: 30 TABLET | Refills: 11 | Status: SHIPPED | OUTPATIENT
Start: 2019-01-04

## 2019-01-04 NOTE — TELEPHONE ENCOUNTER
Tell him that he can to stop the fenofibrate.  We will monitor his triglyceride and see how they respond.  He needs to be very diligent about his diet

## 2019-01-04 NOTE — TELEPHONE ENCOUNTER
patient currently taking fenofibrate 160mg once a day in the morning.    Per pharmacy the medication is on back order by the . Please let them know what they can give patient as an alterative      Pharmacy [facility] [address] in [place]

## 2019-01-09 ENCOUNTER — OFFICE VISIT (OUTPATIENT)
Dept: INTERNAL MEDICINE | Facility: CLINIC | Age: 64
End: 2019-01-09
Payer: MEDICARE

## 2019-01-09 VITALS
HEIGHT: 66 IN | TEMPERATURE: 98 F | HEART RATE: 90 BPM | OXYGEN SATURATION: 99 % | WEIGHT: 206.38 LBS | BODY MASS INDEX: 33.17 KG/M2 | DIASTOLIC BLOOD PRESSURE: 64 MMHG | SYSTOLIC BLOOD PRESSURE: 131 MMHG

## 2019-01-09 DIAGNOSIS — E78.2 DM TYPE 2 WITH DIABETIC MIXED HYPERLIPIDEMIA: ICD-10-CM

## 2019-01-09 DIAGNOSIS — N18.30 ANEMIA IN STAGE 3 CHRONIC KIDNEY DISEASE: ICD-10-CM

## 2019-01-09 DIAGNOSIS — D63.1 ANEMIA IN STAGE 3 CHRONIC KIDNEY DISEASE: ICD-10-CM

## 2019-01-09 DIAGNOSIS — Z93.3 COLOSTOMY STATUS: ICD-10-CM

## 2019-01-09 DIAGNOSIS — I13.10: ICD-10-CM

## 2019-01-09 DIAGNOSIS — Z79.4 TYPE 2 DIABETES MELLITUS WITH STAGE 4 CHRONIC KIDNEY DISEASE, WITH LONG-TERM CURRENT USE OF INSULIN: ICD-10-CM

## 2019-01-09 DIAGNOSIS — I15.2 HYPERTENSION ASSOCIATED WITH DIABETES: ICD-10-CM

## 2019-01-09 DIAGNOSIS — E11.8 TYPE 2 DIABETES MELLITUS WITH COMPLICATION, WITHOUT LONG-TERM CURRENT USE OF INSULIN: ICD-10-CM

## 2019-01-09 DIAGNOSIS — E11.59 HYPERTENSION ASSOCIATED WITH DIABETES: ICD-10-CM

## 2019-01-09 DIAGNOSIS — Z00.00 ROUTINE GENERAL MEDICAL EXAMINATION AT A HEALTH CARE FACILITY: Primary | ICD-10-CM

## 2019-01-09 DIAGNOSIS — N18.4: ICD-10-CM

## 2019-01-09 DIAGNOSIS — Z85.038 HISTORY OF COLON CANCER: ICD-10-CM

## 2019-01-09 DIAGNOSIS — E11.69 DM TYPE 2 WITH DIABETIC MIXED HYPERLIPIDEMIA: ICD-10-CM

## 2019-01-09 DIAGNOSIS — N18.4 CKD (CHRONIC KIDNEY DISEASE), STAGE IV: ICD-10-CM

## 2019-01-09 DIAGNOSIS — N18.4 TYPE 2 DIABETES MELLITUS WITH STAGE 4 CHRONIC KIDNEY DISEASE, WITH LONG-TERM CURRENT USE OF INSULIN: ICD-10-CM

## 2019-01-09 DIAGNOSIS — Z87.39 HISTORY OF GOUT: ICD-10-CM

## 2019-01-09 DIAGNOSIS — E11.22 TYPE 2 DIABETES MELLITUS WITH STAGE 4 CHRONIC KIDNEY DISEASE, WITH LONG-TERM CURRENT USE OF INSULIN: ICD-10-CM

## 2019-01-09 PROCEDURE — 3078F DIAST BP <80 MM HG: CPT | Mod: CPTII,S$GLB,, | Performed by: INTERNAL MEDICINE

## 2019-01-09 PROCEDURE — 3075F PR MOST RECENT SYSTOLIC BLOOD PRESS GE 130-139MM HG: ICD-10-PCS | Mod: CPTII,S$GLB,, | Performed by: INTERNAL MEDICINE

## 2019-01-09 PROCEDURE — 3046F PR MOST RECENT HEMOGLOBIN A1C LEVEL > 9.0%: ICD-10-PCS | Mod: CPTII,S$GLB,, | Performed by: INTERNAL MEDICINE

## 2019-01-09 PROCEDURE — 3078F PR MOST RECENT DIASTOLIC BLOOD PRESSURE < 80 MM HG: ICD-10-PCS | Mod: CPTII,S$GLB,, | Performed by: INTERNAL MEDICINE

## 2019-01-09 PROCEDURE — 99999 PR PBB SHADOW E&M-EST. PATIENT-LVL III: ICD-10-PCS | Mod: PBBFAC,,, | Performed by: INTERNAL MEDICINE

## 2019-01-09 PROCEDURE — 3075F SYST BP GE 130 - 139MM HG: CPT | Mod: CPTII,S$GLB,, | Performed by: INTERNAL MEDICINE

## 2019-01-09 PROCEDURE — 3046F HEMOGLOBIN A1C LEVEL >9.0%: CPT | Mod: CPTII,S$GLB,, | Performed by: INTERNAL MEDICINE

## 2019-01-09 PROCEDURE — 99396 PREV VISIT EST AGE 40-64: CPT | Mod: S$GLB,,, | Performed by: INTERNAL MEDICINE

## 2019-01-09 PROCEDURE — 99396 PR PREVENTIVE VISIT,EST,40-64: ICD-10-PCS | Mod: S$GLB,,, | Performed by: INTERNAL MEDICINE

## 2019-01-09 PROCEDURE — 99999 PR PBB SHADOW E&M-EST. PATIENT-LVL III: CPT | Mod: PBBFAC,,, | Performed by: INTERNAL MEDICINE

## 2019-01-09 NOTE — PROGRESS NOTES
HPI:  Patient is a 63-year-old man who comes today for follow-up of diabetes, hypertension, lipids, anemia secondary to his chronic kidney disease, chronic kidney disease, retroperitoneal fibrosis and for his annual physical exam.  He currently has internal ureteral stents.  Patient denies any hypoglycemic events.  He does not faithfully check his blood sugars.  His blood pressures been well controlled.  He states he is taking his diabetic medications.  He admits that the Victoza is quite expensive for him and does not know if he can take it much longer once he gets into the doughnut hole.      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 Denies any nausea, vomiting or diarrhea. Denies any fever, chills or sweats. Denies any change in weight, voice, stool, skin or hair. Denies any dysuria, dyspepsia or dysphagia. Denies any change in vision, hearing or headaches. Denies any swollen lymph nodes or loss of memory.    PE:  /64  "(BP Location: Right arm, Patient Position: Sitting, BP Method: Medium (Automatic))   Pulse 90   Temp 98.4 °F (36.9 °C) (Tympanic)   Ht 5' 6" (1.676 m)   Wt 93.6 kg (206 lb 5.6 oz)   SpO2 99%   BMI 33.31 kg/m²   Gen: Well-developed, well-nourished, male, in no acute distress, oriented x3  HEENT: neck is supple, no adenopathy, carotids 2+ equal without bruits, thyroid exam normal size without nodules.  CHEST: clear to auscultation and percussion  CVS: regular rate and rhythm without significant murmur, gallop, or rubs  ABD: soft, benign, no rebound no guarding, no distention.  Bowel sounds are normal.  No palpable masses.  No organomegaly and no audible bruits.  He has a colostomy in the right upper quadrant.  He has numerous scars  RECTAL:  Deferred  EXT: no clubbing, cyanosis, or edema  LYMPH: no cervical, inguinal, or axillary adenopathy  FEET: no loss of sensation.  No ulcers or pressure sores.  NEURO: gait normal.  Cranial nerves II- XII intact. No nystagmus.  Speech normal.   Gross motor and sensory unremarkable.    Lab Results   Component Value Date    WBC 9.61 10/10/2018    HGB 10.5 (L) 10/10/2018    HCT 33.5 (L) 10/10/2018     (H) 10/10/2018    CHOL 126 01/03/2019    TRIG 162 (H) 01/03/2019    HDL 43 01/03/2019    ALT 13 07/26/2018    AST 22 07/26/2018     01/03/2019    K 4.4 01/03/2019     01/03/2019    CREATININE 3.0 (H) 01/03/2019    BUN 41 (H) 01/03/2019    CO2 20 (L) 01/03/2019    TSH 1.948 04/24/2018    PSA 0.13 08/21/2014    HGBA1C 10.1 (H) 01/03/2019       Impression:  Chronic kidney disease, multifactorial due to obstructive uropathy from his retroperitoneal fibrosis in from his diabetes and hypertension.  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 I think it would be best is change in from Victoza just insulin alone.  He would like to use all the Victoza he currently has.  He will see me in 3 months with the above lab work.  His other medications remain the same

## 2019-01-28 RX ORDER — FENOFIBRATE 160 MG/1
TABLET ORAL
Qty: 30 TABLET | Refills: 11 | Status: SHIPPED | OUTPATIENT
Start: 2019-01-28

## 2019-02-04 ENCOUNTER — TELEPHONE (OUTPATIENT)
Dept: INTERNAL MEDICINE | Facility: CLINIC | Age: 64
End: 2019-02-04

## 2019-02-04 RX ORDER — INSULIN GLARGINE 100 [IU]/ML
20 INJECTION, SOLUTION SUBCUTANEOUS NIGHTLY
Qty: 1 BOX | Refills: 11 | Status: SHIPPED | OUTPATIENT
Start: 2019-02-04 | End: 2019-08-22 | Stop reason: SDUPTHER

## 2019-02-04 NOTE — TELEPHONE ENCOUNTER
----- Message from Rekha Hall sent at 2/4/2019  8:48 AM CST -----  Contact: self [phone]  States that he needs to speak to nurse regarding some medications that Dr Rouse want to put him on. Please call back at [phone]//thank you acc

## 2019-02-04 NOTE — TELEPHONE ENCOUNTER
Pt called insurance and the leqimir and Basaglar is covered pt would  like the pn and want to try a 30 day supply sent to local pharmacy to try . Please Advise

## 2019-02-08 ENCOUNTER — OFFICE VISIT (OUTPATIENT)
Dept: INTERNAL MEDICINE | Facility: CLINIC | Age: 64
End: 2019-02-08
Payer: MEDICARE

## 2019-02-08 VITALS
HEART RATE: 104 BPM | SYSTOLIC BLOOD PRESSURE: 108 MMHG | OXYGEN SATURATION: 96 % | WEIGHT: 205.94 LBS | TEMPERATURE: 101 F | HEIGHT: 66 IN | DIASTOLIC BLOOD PRESSURE: 64 MMHG | BODY MASS INDEX: 33.1 KG/M2

## 2019-02-08 DIAGNOSIS — N18.4 CKD (CHRONIC KIDNEY DISEASE), STAGE IV: ICD-10-CM

## 2019-02-08 DIAGNOSIS — L08.9 SKIN INFECTION: Primary | ICD-10-CM

## 2019-02-08 DIAGNOSIS — K68.2 RETROPERITONEAL FIBROSIS: ICD-10-CM

## 2019-02-08 PROCEDURE — 3078F DIAST BP <80 MM HG: CPT | Mod: CPTII,S$GLB,, | Performed by: NURSE PRACTITIONER

## 2019-02-08 PROCEDURE — 99213 PR OFFICE/OUTPT VISIT, EST, LEVL III, 20-29 MIN: ICD-10-PCS | Mod: S$GLB,,, | Performed by: NURSE PRACTITIONER

## 2019-02-08 PROCEDURE — 3008F PR BODY MASS INDEX (BMI) DOCUMENTED: ICD-10-PCS | Mod: CPTII,S$GLB,, | Performed by: NURSE PRACTITIONER

## 2019-02-08 PROCEDURE — 99213 OFFICE O/P EST LOW 20 MIN: CPT | Mod: S$GLB,,, | Performed by: NURSE PRACTITIONER

## 2019-02-08 PROCEDURE — 99999 PR PBB SHADOW E&M-EST. PATIENT-LVL V: CPT | Mod: PBBFAC,,, | Performed by: NURSE PRACTITIONER

## 2019-02-08 PROCEDURE — 3074F SYST BP LT 130 MM HG: CPT | Mod: CPTII,S$GLB,, | Performed by: NURSE PRACTITIONER

## 2019-02-08 PROCEDURE — 99999 PR PBB SHADOW E&M-EST. PATIENT-LVL V: ICD-10-PCS | Mod: PBBFAC,,, | Performed by: NURSE PRACTITIONER

## 2019-02-08 PROCEDURE — 3078F PR MOST RECENT DIASTOLIC BLOOD PRESSURE < 80 MM HG: ICD-10-PCS | Mod: CPTII,S$GLB,, | Performed by: NURSE PRACTITIONER

## 2019-02-08 PROCEDURE — 3074F PR MOST RECENT SYSTOLIC BLOOD PRESSURE < 130 MM HG: ICD-10-PCS | Mod: CPTII,S$GLB,, | Performed by: NURSE PRACTITIONER

## 2019-02-08 PROCEDURE — 3008F BODY MASS INDEX DOCD: CPT | Mod: CPTII,S$GLB,, | Performed by: NURSE PRACTITIONER

## 2019-02-08 RX ORDER — MUPIROCIN 20 MG/G
OINTMENT TOPICAL 3 TIMES DAILY
Qty: 22 G | Refills: 0 | Status: SHIPPED | OUTPATIENT
Start: 2019-02-08 | End: 2019-05-02 | Stop reason: SDUPTHER

## 2019-02-08 NOTE — PROGRESS NOTES
"Subjective:      Patient ID: Rickey Amaya Jr. is a 63 y.o. male.    Chief Complaint: Fever    HPI:  Patient states for the last 2 days has had a fever ranging from 99.5-101.6.   Has been taking Tylenol prn.  Has nephro tubes in, seeing dr So and Dr Adrian.  Says has been on several antibiotics recently, stopped the Cipro due to GERD.  Says always has sediment in urine tubes, strands.  He says his mother changed his dressing to his insertion site this morning and saw puss.  Says he has an appt with Dr So next Thursday.      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     DM type 2 with diabetic mixed hyperlipidemia     History of colon cancer 2005    History of gout     Hyperlipidemia associated with type 2 diabetes mellitus     Hypertension associated with diabetes     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       Past Surgical History:   Procedure Laterality Date    s/p exp lap times three      complications of colon cancer surgery    S/P partial coloectomy      with colostomy d/t colon cancer       Lab Results   Component Value Date    WBC 9.61 10/10/2018    HGB 10.5 (L) 10/10/2018    HCT 33.5 (L) 10/10/2018     (H) 10/10/2018    CHOL 126 01/03/2019    TRIG 162 (H) 01/03/2019    HDL 43 01/03/2019    ALT 13 07/26/2018    AST 22 07/26/2018     01/03/2019    K 4.4 01/03/2019     01/03/2019    CREATININE 3.0 (H) 01/03/2019    BUN 41 (H) 01/03/2019    CO2 20 (L) 01/03/2019    TSH 1.948 04/24/2018    PSA 0.13 08/21/2014    HGBA1C 10.1 (H) 01/03/2019       /64 (BP Location: Right arm)   Pulse 104   Temp (!) 100.8 °F (38.2 °C) (Tympanic)   Ht 5' 6" (1.676 m)   Wt 93.4 kg (205 " lb 14.6 oz)   SpO2 96%   BMI 33.23 kg/m²       Review of Systems   Constitutional: Positive for fever. Negative for appetite change, chills and diaphoresis.   HENT: Negative for congestion, ear pain, postnasal drip, rhinorrhea, sneezing, sore throat and trouble swallowing.    Eyes: Negative for photophobia, pain and visual disturbance.   Respiratory: Negative for apnea, cough, choking, chest tightness, shortness of breath and wheezing.    Cardiovascular: Negative for chest pain, palpitations and leg swelling.   Gastrointestinal: Negative for abdominal pain, constipation, diarrhea, nausea and vomiting.   Genitourinary: Negative for decreased urine volume, difficulty urinating, dysuria, hematuria and urgency.   Musculoskeletal: Negative for arthralgias, gait problem, joint swelling and myalgias.   Skin: Negative for rash.   Neurological: Negative for dizziness, tremors, seizures, syncope, weakness, light-headedness, numbness and headaches.   Psychiatric/Behavioral: Negative for agitation, confusion, decreased concentration, hallucinations and sleep disturbance. The patient is not nervous/anxious.       Objective:     Physical Exam   Constitutional: He is oriented to person, place, and time. He appears well-developed and well-nourished. No distress.   Genitourinary:   Genitourinary Comments: nephro tubes light yellow urine with sediment noted   Musculoskeletal:   Normal gait   Neurological: He is alert and oriented to person, place, and time.   Skin: Skin is warm and dry.   Posterior insertion site of nephro tube has yellowish/greenish dc.     Psychiatric: He has a normal mood and affect. His behavior is normal.     Assessment:      1. Skin infection    2. Retroperitoneal fibrosis    3. CKD (chronic kidney disease), stage IV      Plan:   Skin infection    Retroperitoneal fibrosis    CKD (chronic kidney disease), stage IV    Other orders  -     mupirocin (BACTROBAN) 2 % ointment; Apply topically 3 (three) times daily.   Dispense: 22 g; Refill: 0    has been on many antibiotics, some chronically, have been changed.  Unsure what to put him on.  I have asked him to contact Dr So's office to see if they need to see him today instead of next week.  Chronic, advance CKD.  To ER for worse ss      Current Outpatient Medications:     allopurinol (ZYLOPRIM) 300 MG tablet, TAKE ONE TABLET BY MOUTH ONCE DAILY, Disp: 90 tablet, Rfl: 3    aspirin 81 MG Chew, Take 81 mg by mouth daily as needed. , Disp: , Rfl:     blood sugar diagnostic (TRUETEST TEST STRIPS) Strp, 1 strip by Misc.(Non-Drug; Combo Route) route 2 (two) times daily., Disp: 200 strip, Rfl: 3    colchicine 0.6 mg tablet, Take 0.6 mg by mouth daily as needed. , Disp: , Rfl:     fenofibrate 160 MG Tab, TAKE ONE TABLET BY MOUTH ONCE DAILY IN THE MORNING, Disp: 30 tablet, Rfl: 11    ferrous sulfate 134 mg (27 mg iron) Tab, Take 1 tablet by mouth 2 (two) times daily., Disp: , Rfl:     hydrochlorothiazide (HYDRODIURIL) 25 MG tablet, Take 1 tablet (25 mg total) by mouth once daily. 1 Tablet Oral Every day, Disp: 30 tablet, Rfl: 11    hydrOXYzine pamoate (VISTARIL) 25 MG Cap, Take 1 capsule (25 mg total) by mouth every 8 (eight) hours as needed., Disp: 90 capsule, Rfl: 11    hyoscyamine (LEVSIN/SL) 0.125 mg Subl, , Disp: , Rfl:     insulin (BASAGLAR KWIKPEN U-100 INSULIN) glargine 100 units/mL (3mL) SubQ pen, Inject 20 Units into the skin every evening., Disp: 1 Box, Rfl: 11    lactobacillus combination no.9 (ADULT 50+ PROBIOTIC) 4 billion cell Cap, Take by mouth., Disp: , Rfl:     liraglutide 0.6 mg/0.1 mL, 18 mg/3 mL, subq PNIJ (VICTOZA 3-KIRBY) 0.6 mg/0.1 mL (18 mg/3 mL) PnIj, Inject 1.8 mg into the skin once daily., Disp: 9 mL, Rfl: 11    lisinopril (PRINIVIL,ZESTRIL) 2.5 MG tablet, Take 1 tablet (2.5 mg total) by mouth once daily., Disp: 90 tablet, Rfl: 3    loperamide (IMODIUM A-D) 2 mg Tab, Take 2 mg by mouth 4 (four) times daily as needed., Disp: , Rfl:     metoprolol  "succinate (TOPROL-XL) 100 MG 24 hr tablet, TAKE ONE TABLET BY MOUTH ONCE DAILY, Disp: 30 tablet, Rfl: 11    metoprolol succinate (TOPROL-XL) 100 MG 24 hr tablet, Take 1 tablet (100 mg total) by mouth once daily., Disp: 30 tablet, Rfl: 11    multivitamin (THERAGRAN) per tablet, Take by mouth. 1 Tablet Oral Every day, Disp: , Rfl:     niacin 500 MG Tab, Take 100 mg by mouth 2 (two) times daily with meals. , Disp: , Rfl:     nystatin-triamcinolone (MYCOLOG II) cream, , Disp: , Rfl:     pen needle, diabetic 33 gauge x 5/32" Ndle, 1 Units by Misc.(Non-Drug; Combo Route) route once daily., Disp: 100 each, Rfl: 3    promethazine (PHENERGAN) 25 MG tablet, Take 1 tablet (25 mg total) by mouth every 6 (six) hours as needed for Nausea., Disp: 30 tablet, Rfl: 5    simvastatin (ZOCOR) 40 MG tablet, TAKE ONE TABLET BY MOUTH ONCE DAILY IN THE EVENING, Disp: 90 tablet, Rfl: 0    tamsulosin (FLOMAX) 0.4 mg Cp24, Take by mouth. 1 Capsule, Ext Release 24 hr Oral At bedtime, Disp: , Rfl:     tiZANidine (ZANAFLEX) 2 MG tablet, Take 1 tablet (2 mg total) by mouth 6 (six) times daily. 1 Capsule Oral Every day, Disp: 360 tablet, Rfl: 0    glimepiride (AMARYL) 4 MG tablet, Take 1 tablet (4 mg total) by mouth before breakfast., Disp: 90 tablet, Rfl: 3    mupirocin (BACTROBAN) 2 % ointment, Apply topically 3 (three) times daily., Disp: 22 g, Rfl: 0  "

## 2019-02-26 RX ORDER — GLIMEPIRIDE 4 MG/1
TABLET ORAL
Qty: 90 TABLET | Refills: 3 | Status: SHIPPED | OUTPATIENT
Start: 2019-02-26 | End: 2019-09-19

## 2019-03-06 ENCOUNTER — LAB VISIT (OUTPATIENT)
Dept: LAB | Facility: HOSPITAL | Age: 64
End: 2019-03-06
Attending: INTERNAL MEDICINE
Payer: MEDICARE

## 2019-03-06 DIAGNOSIS — N18.4 CKD (CHRONIC KIDNEY DISEASE) STAGE 4, GFR 15-29 ML/MIN: ICD-10-CM

## 2019-03-06 LAB
ALBUMIN SERPL BCP-MCNC: 2.9 G/DL
ANION GAP SERPL CALC-SCNC: 10 MMOL/L
BUN SERPL-MCNC: 30 MG/DL
CALCIUM SERPL-MCNC: 9.3 MG/DL
CHLORIDE SERPL-SCNC: 108 MMOL/L
CO2 SERPL-SCNC: 19 MMOL/L
CREAT SERPL-MCNC: 2.3 MG/DL
EST. GFR  (AFRICAN AMERICAN): 33.4 ML/MIN/1.73 M^2
EST. GFR  (NON AFRICAN AMERICAN): 28.9 ML/MIN/1.73 M^2
GLUCOSE SERPL-MCNC: 180 MG/DL
PHOSPHATE SERPL-MCNC: 3.7 MG/DL
POTASSIUM SERPL-SCNC: 4.3 MMOL/L
SODIUM SERPL-SCNC: 137 MMOL/L

## 2019-03-06 PROCEDURE — 80069 RENAL FUNCTION PANEL: CPT

## 2019-03-06 PROCEDURE — 36415 COLL VENOUS BLD VENIPUNCTURE: CPT | Mod: PO

## 2019-03-13 ENCOUNTER — OFFICE VISIT (OUTPATIENT)
Dept: NEPHROLOGY | Facility: CLINIC | Age: 64
End: 2019-03-13
Payer: MEDICARE

## 2019-03-13 VITALS
HEIGHT: 66 IN | DIASTOLIC BLOOD PRESSURE: 64 MMHG | BODY MASS INDEX: 33.2 KG/M2 | WEIGHT: 206.56 LBS | SYSTOLIC BLOOD PRESSURE: 120 MMHG | HEART RATE: 80 BPM

## 2019-03-13 DIAGNOSIS — E11.29 PROTEINURIA DUE TO TYPE 2 DIABETES MELLITUS: ICD-10-CM

## 2019-03-13 DIAGNOSIS — N18.4 CKD (CHRONIC KIDNEY DISEASE) STAGE 4, GFR 15-29 ML/MIN: Primary | ICD-10-CM

## 2019-03-13 DIAGNOSIS — R80.9 PROTEINURIA DUE TO TYPE 2 DIABETES MELLITUS: ICD-10-CM

## 2019-03-13 PROCEDURE — 3046F PR MOST RECENT HEMOGLOBIN A1C LEVEL > 9.0%: ICD-10-PCS | Mod: CPTII,S$GLB,, | Performed by: INTERNAL MEDICINE

## 2019-03-13 PROCEDURE — 3046F HEMOGLOBIN A1C LEVEL >9.0%: CPT | Mod: CPTII,S$GLB,, | Performed by: INTERNAL MEDICINE

## 2019-03-13 PROCEDURE — 99214 PR OFFICE/OUTPT VISIT, EST, LEVL IV, 30-39 MIN: ICD-10-PCS | Mod: S$GLB,,, | Performed by: INTERNAL MEDICINE

## 2019-03-13 PROCEDURE — 99214 OFFICE O/P EST MOD 30 MIN: CPT | Mod: S$GLB,,, | Performed by: INTERNAL MEDICINE

## 2019-03-13 PROCEDURE — 3074F PR MOST RECENT SYSTOLIC BLOOD PRESSURE < 130 MM HG: ICD-10-PCS | Mod: CPTII,S$GLB,, | Performed by: INTERNAL MEDICINE

## 2019-03-13 PROCEDURE — 3078F PR MOST RECENT DIASTOLIC BLOOD PRESSURE < 80 MM HG: ICD-10-PCS | Mod: CPTII,S$GLB,, | Performed by: INTERNAL MEDICINE

## 2019-03-13 PROCEDURE — 3008F PR BODY MASS INDEX (BMI) DOCUMENTED: ICD-10-PCS | Mod: CPTII,S$GLB,, | Performed by: INTERNAL MEDICINE

## 2019-03-13 PROCEDURE — 3074F SYST BP LT 130 MM HG: CPT | Mod: CPTII,S$GLB,, | Performed by: INTERNAL MEDICINE

## 2019-03-13 PROCEDURE — 99499 RISK ADDL DX/OHS AUDIT: ICD-10-PCS | Mod: S$GLB,,, | Performed by: INTERNAL MEDICINE

## 2019-03-13 PROCEDURE — 3008F BODY MASS INDEX DOCD: CPT | Mod: CPTII,S$GLB,, | Performed by: INTERNAL MEDICINE

## 2019-03-13 PROCEDURE — 99999 PR PBB SHADOW E&M-EST. PATIENT-LVL III: ICD-10-PCS | Mod: PBBFAC,,, | Performed by: INTERNAL MEDICINE

## 2019-03-13 PROCEDURE — 99499 UNLISTED E&M SERVICE: CPT | Mod: S$GLB,,, | Performed by: INTERNAL MEDICINE

## 2019-03-13 PROCEDURE — 3078F DIAST BP <80 MM HG: CPT | Mod: CPTII,S$GLB,, | Performed by: INTERNAL MEDICINE

## 2019-03-13 PROCEDURE — 99999 PR PBB SHADOW E&M-EST. PATIENT-LVL III: CPT | Mod: PBBFAC,,, | Performed by: INTERNAL MEDICINE

## 2019-03-13 NOTE — PROGRESS NOTES
PROGRESS NOTE FOR ESTABLISHED PATIENT    PHYSICIAN REQUESTING THE CONSULT: Dr. Octavio Rouse    REASON FOR CONSULTATION: Renal insufficiency    63 y.o. male with history of retroperitoneal fibrosis, HTN, gout, colon cancer, DM2, nephrolithiasis, anemia, incontinence presents to the renal clinic for evaluation of renal insufficiency.     Patient today reports that he was started on insulin (Victoza was stopped). He has ureteral stent replacement scheduled for 3/25/19 at [facility]. Patient also has bilateral nephrostomy tubes in place.  No complaints today.          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     DM type 2 with diabetic mixed hyperlipidemia     History of colon cancer 2005    History of gout     Hyperlipidemia associated with type 2 diabetes mellitus     Hypertension associated with diabetes     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       Past Surgical History:   Procedure Laterality Date    s/p exp lap times three      complications of colon cancer surgery    S/P partial coloectomy      with colostomy d/t colon cancer       Review of patient's allergies indicates:   Allergen Reactions    Hydrocodone-acetaminophen      Other reaction(s): Rash    Metformin      Advised no metformin due to kidneys    Diazepam Itching and Rash     Other reaction(s): Itching  Other reaction(s): Rash    Morphine Rash     Other reaction(s): Rash  Other reaction(s): Rash  Other reaction(s): Rash    Propoxyphene n-acetaminophen Rash     Other reaction(s): Rash    Propoxyphene-acetaminophen Rash       Current Outpatient Medications   Medication Sig Dispense Refill    allopurinol (ZYLOPRIM) 300 MG tablet  "TAKE ONE TABLET BY MOUTH ONCE DAILY 90 tablet 3    aspirin 81 MG Chew Take 81 mg by mouth daily as needed.       blood sugar diagnostic (TRUETEST TEST STRIPS) Strp 1 strip by Misc.(Non-Drug; Combo Route) route 2 (two) times daily. 200 strip 3    colchicine 0.6 mg tablet Take 0.6 mg by mouth daily as needed.       fenofibrate 160 MG Tab TAKE ONE TABLET BY MOUTH ONCE DAILY IN THE MORNING 30 tablet 11    ferrous sulfate 134 mg (27 mg iron) Tab Take 1 tablet by mouth 2 (two) times daily.      glimepiride (AMARYL) 4 MG tablet TAKE ONE TABLET BY MOUTH BEFORE BREAKFAST 90 tablet 3    hydrochlorothiazide (HYDRODIURIL) 25 MG tablet Take 1 tablet (25 mg total) by mouth once daily. 1 Tablet Oral Every day 30 tablet 11    hydrOXYzine pamoate (VISTARIL) 25 MG Cap Take 1 capsule (25 mg total) by mouth every 8 (eight) hours as needed. 90 capsule 11    hyoscyamine (LEVSIN/SL) 0.125 mg Subl       insulin (BASAGLAR KWIKPEN U-100 INSULIN) glargine 100 units/mL (3mL) SubQ pen Inject 20 Units into the skin every evening. 1 Box 11    lactobacillus combination no.9 (ADULT 50+ PROBIOTIC) 4 billion cell Cap Take by mouth.      lisinopril (PRINIVIL,ZESTRIL) 2.5 MG tablet Take 1 tablet (2.5 mg total) by mouth once daily. 90 tablet 3    loperamide (IMODIUM A-D) 2 mg Tab Take 2 mg by mouth 4 (four) times daily as needed.      metoprolol succinate (TOPROL-XL) 100 MG 24 hr tablet TAKE ONE TABLET BY MOUTH ONCE DAILY 30 tablet 11    metoprolol succinate (TOPROL-XL) 100 MG 24 hr tablet Take 1 tablet (100 mg total) by mouth once daily. 30 tablet 11    multivitamin (THERAGRAN) per tablet Take by mouth. 1 Tablet Oral Every day      mupirocin (BACTROBAN) 2 % ointment Apply topically 3 (three) times daily. 22 g 0    niacin 500 MG Tab Take 100 mg by mouth 2 (two) times daily with meals.       nystatin-triamcinolone (MYCOLOG II) cream       pen needle, diabetic 33 gauge x 5/32" Ndle 1 Units by Misc.(Non-Drug; Combo Route) route once " daily. 100 each 3    promethazine (PHENERGAN) 25 MG tablet Take 1 tablet (25 mg total) by mouth every 6 (six) hours as needed for Nausea. 30 tablet 5    simvastatin (ZOCOR) 40 MG tablet TAKE ONE TABLET BY MOUTH ONCE DAILY IN THE EVENING 90 tablet 0    tamsulosin (FLOMAX) 0.4 mg Cp24 Take by mouth. 1 Capsule, Ext Release 24 hr Oral At bedtime      tiZANidine (ZANAFLEX) 2 MG tablet Take 1 tablet (2 mg total) by mouth 6 (six) times daily. 1 Capsule Oral Every day 360 tablet 0     No current facility-administered medications for this visit.        Family History   Problem Relation Age of Onset    Diabetes Mother        Social History     Socioeconomic History    Marital status:      Spouse name: Not on file    Number of children: Not on file    Years of education: Not on file    Highest education level: Not on file   Social Needs    Financial resource strain: Not on file    Food insecurity - worry: Not on file    Food insecurity - inability: Not on file    Transportation needs - medical: Not on file    Transportation needs - non-medical: Not on file   Occupational History    Not on file   Tobacco Use    Smoking status: Former Smoker     Packs/day: 2.50     Years: 35.00     Pack years: 87.50     Types: Cigarettes     Last attempt to quit: 2005     Years since quittin.6    Smokeless tobacco: Never Used   Substance and Sexual Activity    Alcohol use: No    Drug use: No    Sexual activity: No   Other Topics Concern    Not on file   Social History Narrative    Not on file       Review of Systems:  1. GENERAL: patient denies any fever, weight changes, generalized weakness, dizziness.  2. HEENT: patient denies headaches, visual disturbances, swallowing problems, sinus pain, nasal congestion.  3. CARDIOVASCULAR: patient denies chest pain, palpitations.  4. PULMONARY: patient denies SOB, coughing, hemoptysis, wheezing.  5. GASTROINTESTINAL: patient denies abdominal pain, nausea, vomiting,  "diarrhea, colostomy bag in place  6. GENITOURINARY: patient denies dysuria, hematuria no hesitancy, frequency.  7. EXTREMITIES: patient denies LE edema, LE cramping.  8. DERMATOLOGY: patient denies rashes, ulcers.  9. NEURO: patient denies tremors, extremity weakness, extremity numbness/tingling.  10. MUSCULOSKELETAL: patient denies joint pain, joint swelling.  11. HEMATOLOGY: patient denies rectal or gum bleeding.  12: PSYCH: patient denies anxiety, depression.      PHYSICAL EXAM:    /64   Pulse 80   Ht 5' 6" (1.676 m)   Wt 93.7 kg (206 lb 9.1 oz)   BMI 33.34 kg/m²     GENERAL: Pleasant gentleman presents to clinic with non-labored breathing.  HEENT: PER, no nasal discharge, no icterus, no oral exudates, moist mucosal membranes, bilateral shoulder fat pads  NECK: no thyroid mass, no lymphadenopathy.  HEART: RRR S1/S2, no rubs, good peripheral pulses.  LUNGS: CTA bilaterally, no wheezing, breathing is nonlabored.  ABDOMEN: soft, nontender, not distended, bowel sounds are present, no abdominal hernia, right sided colostomy (with bag), bilateral nephrostomy tubes in place  EXTREM: mild bilateral ankle edema.  SKIN: no rashes, skin is warm and dry.  NEURO: A & O x 3, no obvious focal signs.    LABORATORY RESULTS:    Lab Results   Component Value Date    CREATININE 2.3 (H) 03/06/2019    BUN 30 (H) 03/06/2019     03/06/2019    K 4.3 03/06/2019     03/06/2019    CO2 19 (L) 03/06/2019      Lab Results   Component Value Date    PTH 18.0 10/10/2018    CALCIUM 9.3 03/06/2019    PHOS 3.7 03/06/2019     Lab Results   Component Value Date    ALBUMIN 2.9 (L) 03/06/2019     Lab Results   Component Value Date    WBC 9.61 10/10/2018    HGB 10.5 (L) 10/10/2018    HCT 33.5 (L) 10/10/2018    MCV 87 10/10/2018     (H) 10/10/2018     Protein Creatinine Ratios:    Creatinine, Random Ur   Date Value Ref Range Status   12/05/2018 91.0 23.0 - 375.0 mg/dL Final     Comment:     The random urine reference ranges " provided were established   for 24 hour urine collections.  No reference ranges exist for  random urine specimens.  Correlate clinically.     10/10/2018 113.0 23.0 - 375.0 mg/dL Final     Comment:     The random urine reference ranges provided were established   for 24 hour urine collections.  No reference ranges exist for  random urine specimens.  Correlate clinically.     11/20/2017 94.0 23.0 - 375.0 mg/dL Final     Comment:     The random urine reference ranges provided were established   for 24 hour urine collections.  No reference ranges exist for  random urine specimens.  Correlate clinically.       Protein, Urine Random   Date Value Ref Range Status   12/05/2018 341 (H) 0 - 15 mg/dL Final     Comment:     The random urine reference ranges provided were established   for 24 hour urine collections.  No reference ranges exist for  random urine specimens.  Correlate clinically.     10/10/2018 814 (H) 0 - 15 mg/dL Final     Comment:     The random urine reference ranges provided were established   for 24 hour urine collections.  No reference ranges exist for  random urine specimens.  Correlate clinically.     11/20/2017 188 (H) 0 - 15 mg/dL Final     Comment:     The random urine reference ranges provided were established   for 24 hour urine collections.  No reference ranges exist for  random urine specimens.  Correlate clinically.       Prot/Creat Ratio, Ur   Date Value Ref Range Status   12/05/2018 3.75 (H) 0.00 - 0.20 Final   10/10/2018 7.20 (H) 0.00 - 0.20 Final   11/20/2017 2.00 (H) 0.00 - 0.20 Final           ASSESSMENT AND PLAN:  63 y.o. male with history of retroperitoneal fibrosis, HTN, gout, colon cancer, DM2, nephrolithiasis, anemia, incontinence presents to the renal clinic for evaluation of renal insufficiency.    1. Renal insufficiency: Patient presents with renal insufficiency, consistent with CKD stage 4. Last creatinine was 2.3 (down from 2.8). Cause of his renal insufficiency is likely  multifactorial and related to his long-standing diabetes (he presents with proteinuria) and retroperitoneal fibrosis.  Proteinuria has improved after Lisinopril start and will be continued. He reports ureteral stent replacement that has been scheduled for 3/25/19. He also has bilateral nephrostomy tubes in place. Patient was advised to avoid NSAID pain medications such as advil, aleve, motrin, ibuprofen, naprosyn, meloxicam, diclofenac, mobic and to hydrate with 60-80 ounces of water per day.     2. Electrolytes: at goal.     3. Acid base status: mild acidosis, monitor.     4. Volume: Mild LE edema. Monitor.     5. Hypertension: good BP control.     6. Medications: Reviewed. Finish Cefdinir course.     7. DM2: continue Insulin (Victoza was stopped).     8. Retroperitoneal fibrosis: he has regular follow-up with Dr. So (Urology) for ureteral stent changes (next change on 3/25/19). He also has bilateral nephrostomy tubes in place.

## 2019-04-24 ENCOUNTER — TELEPHONE (OUTPATIENT)
Dept: NEPHROLOGY | Facility: CLINIC | Age: 64
End: 2019-04-24

## 2019-04-24 DIAGNOSIS — R80.9 PROTEINURIA, UNSPECIFIED TYPE: Primary | ICD-10-CM

## 2019-04-24 RX ORDER — CANDESARTAN 4 MG/1
4 TABLET ORAL DAILY
Qty: 90 TABLET | Refills: 3 | Status: SHIPPED | OUTPATIENT
Start: 2019-04-24 | End: 2019-05-27

## 2019-04-24 NOTE — TELEPHONE ENCOUNTER
----- Message from Nasrin Villar sent at 4/24/2019  8:48 AM CDT -----  Contact: pt  Type:  Needs Medical Advice    Who Called: FERMÍN GALLOWAY JR.   Symptoms (please be specific):  Coughing  How long has patient had these symptoms:    Pharmacy name and phone #:     [facility] [id number] [facility] [id number] [facility]  [address] [facility] [address]  Phone: [phone] Fax: [phone]    Would the patient rather a call back or a response via [facility] [place]? Call   Best Call Back Number:  [phone] (home)   Additional Information: pt is requesting a call back from the nurse in regards to the pt needing the Dr carey call in some cough med for him

## 2019-04-24 NOTE — TELEPHONE ENCOUNTER
Patient states since he was placed on Lisinopril he's had a consistent cough. Patient is requesting cough medication to be sent to his pharmacy. Please advise.

## 2019-05-02 RX ORDER — MUPIROCIN 20 MG/G
OINTMENT TOPICAL 3 TIMES DAILY
Qty: 22 G | Refills: 0 | Status: SHIPPED | OUTPATIENT
Start: 2019-05-02 | End: 2019-07-22 | Stop reason: SDUPTHER

## 2019-05-02 RX ORDER — MUPIROCIN 20 MG/G
OINTMENT TOPICAL
Refills: 0 | OUTPATIENT
Start: 2019-05-02

## 2019-05-13 ENCOUNTER — TELEPHONE (OUTPATIENT)
Dept: NEPHROLOGY | Facility: CLINIC | Age: 64
End: 2019-05-13

## 2019-05-13 NOTE — TELEPHONE ENCOUNTER
----- Message from Gunner Fortune sent at 5/13/2019  8:33 AM CDT -----  Contact: Pt   Pt is calling .Type:  Needs Medical Advice    Who Called: Pt   Symptoms (please be specific): concerning diabetics streps, Pt is requesting that the nurse call Pt back.   How long has patient had these symptoms:    Pharmacy name and phone #:    Would the patient rather a call back or a response via [facility]? Call back   Best Call Back Number: [phone]  Additional Information:           .Thank You  Taina Fortune

## 2019-05-13 NOTE — TELEPHONE ENCOUNTER
Returned call to patient. He states that Candesartan is making him cough as well. So he stopped it. Please advise on what patient should do.

## 2019-05-13 NOTE — TELEPHONE ENCOUNTER
----- Message from Isabel Delacruz sent at 5/13/2019  8:11 AM CDT -----  Contact: pt  He's calling in regards to medication and how its working pls call pt back at [phone] (home)

## 2019-05-13 NOTE — TELEPHONE ENCOUNTER
Patient states scripts sent to [facility] pharmacy. Requesting them to be faxed to [phone].

## 2019-05-22 ENCOUNTER — TELEPHONE (OUTPATIENT)
Dept: NEPHROLOGY | Facility: CLINIC | Age: 64
End: 2019-05-22

## 2019-05-22 NOTE — TELEPHONE ENCOUNTER
----- Message from Chioma Chang sent at 5/22/2019  8:15 AM CDT -----  Contact: pt  Please give pt a call at .[phone] (home) he states that he needs to be seen as soon as possible to speak with provider regarding a medication and a persistent cough he has.

## 2019-05-23 ENCOUNTER — TELEPHONE (OUTPATIENT)
Dept: INTERNAL MEDICINE | Facility: CLINIC | Age: 64
End: 2019-05-23

## 2019-05-23 RX ORDER — HYDROXYZINE PAMOATE 25 MG/1
CAPSULE ORAL
Qty: 90 CAPSULE | Refills: 0 | Status: SHIPPED | OUTPATIENT
Start: 2019-05-23 | End: 2019-09-19

## 2019-05-23 NOTE — TELEPHONE ENCOUNTER
Spoke with Lima (Ann) she states that all strengths of Vistaril is on back order. They do have Atarax.

## 2019-05-23 NOTE — TELEPHONE ENCOUNTER
Per pharmacy Vistaril 25mg is on back order until further notice, would you like to change it to something else. Please Advise

## 2019-05-27 ENCOUNTER — OFFICE VISIT (OUTPATIENT)
Dept: NEPHROLOGY | Facility: CLINIC | Age: 64
End: 2019-05-27
Payer: MEDICARE

## 2019-05-27 VITALS
BODY MASS INDEX: 34.29 KG/M2 | HEIGHT: 66 IN | WEIGHT: 213.38 LBS | DIASTOLIC BLOOD PRESSURE: 70 MMHG | HEART RATE: 74 BPM | SYSTOLIC BLOOD PRESSURE: 128 MMHG

## 2019-05-27 DIAGNOSIS — R80.9 PROTEINURIA, UNSPECIFIED TYPE: ICD-10-CM

## 2019-05-27 PROCEDURE — 99214 OFFICE O/P EST MOD 30 MIN: CPT | Mod: S$GLB,,, | Performed by: INTERNAL MEDICINE

## 2019-05-27 PROCEDURE — 3074F PR MOST RECENT SYSTOLIC BLOOD PRESSURE < 130 MM HG: ICD-10-PCS | Mod: CPTII,S$GLB,, | Performed by: INTERNAL MEDICINE

## 2019-05-27 PROCEDURE — 99214 PR OFFICE/OUTPT VISIT, EST, LEVL IV, 30-39 MIN: ICD-10-PCS | Mod: S$GLB,,, | Performed by: INTERNAL MEDICINE

## 2019-05-27 PROCEDURE — 3074F SYST BP LT 130 MM HG: CPT | Mod: CPTII,S$GLB,, | Performed by: INTERNAL MEDICINE

## 2019-05-27 PROCEDURE — 99499 UNLISTED E&M SERVICE: CPT | Mod: S$GLB,,, | Performed by: INTERNAL MEDICINE

## 2019-05-27 PROCEDURE — 1101F PT FALLS ASSESS-DOCD LE1/YR: CPT | Mod: CPTII,S$GLB,, | Performed by: INTERNAL MEDICINE

## 2019-05-27 PROCEDURE — 1101F PR PT FALLS ASSESS DOC 0-1 FALLS W/OUT INJ PAST YR: ICD-10-PCS | Mod: CPTII,S$GLB,, | Performed by: INTERNAL MEDICINE

## 2019-05-27 PROCEDURE — 99499 RISK ADDL DX/OHS AUDIT: ICD-10-PCS | Mod: S$GLB,,, | Performed by: INTERNAL MEDICINE

## 2019-05-27 PROCEDURE — 3078F PR MOST RECENT DIASTOLIC BLOOD PRESSURE < 80 MM HG: ICD-10-PCS | Mod: CPTII,S$GLB,, | Performed by: INTERNAL MEDICINE

## 2019-05-27 PROCEDURE — 3078F DIAST BP <80 MM HG: CPT | Mod: CPTII,S$GLB,, | Performed by: INTERNAL MEDICINE

## 2019-05-27 PROCEDURE — 3008F PR BODY MASS INDEX (BMI) DOCUMENTED: ICD-10-PCS | Mod: CPTII,S$GLB,, | Performed by: INTERNAL MEDICINE

## 2019-05-27 PROCEDURE — 99999 PR PBB SHADOW E&M-EST. PATIENT-LVL III: ICD-10-PCS | Mod: PBBFAC,,, | Performed by: INTERNAL MEDICINE

## 2019-05-27 PROCEDURE — 3008F BODY MASS INDEX DOCD: CPT | Mod: CPTII,S$GLB,, | Performed by: INTERNAL MEDICINE

## 2019-05-27 PROCEDURE — 99999 PR PBB SHADOW E&M-EST. PATIENT-LVL III: CPT | Mod: PBBFAC,,, | Performed by: INTERNAL MEDICINE

## 2019-05-27 RX ORDER — CANDESARTAN 4 MG/1
4 TABLET ORAL DAILY
Qty: 90 TABLET | Refills: 3
Start: 2019-05-27 | End: 2019-09-19

## 2019-05-27 NOTE — PROGRESS NOTES
Subjective:       Patient ID: Rickey Amaya Jr. is a 64 y.o. White male who presents for follow-up evaluation of CKD stage 4, hypertension, diabetic nephropathy,    Octavio Rouse MD    HPI : Rickey Amaya Jr. is  A 63 y.o. male with history of retroperitoneal fibrosis, HTN, gout, colon cancer, DM2, nephrolithiasis, anemia, incontinence seen in office today in follow-up for above medical problems.  He is followed by Dr Pace, in our clinic.  Was seen in our clinic about 2 months ago.  Patient was started on lisinopril for his diabetic nephropathy and proteinuria.  This medication was discontinued due to cough, he was started on Atacand 4 mg daily, he reports that his cough continued with this medication, sp stopped Atacand about 2 weeks ago, continues to have cough, cough is intermittent, not sure if his cough is from the medication or due to other causes.         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     DM type 2 with diabetic mixed hyperlipidemia     History of colon cancer 2005    History of gout     Hyperlipidemia associated with type 2 diabetes mellitus     Hypertension associated with diabetes     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       Current Outpatient Medications on File Prior to Visit   Medication Sig Dispense Refill    allopurinol (ZYLOPRIM) 300 MG tablet TAKE ONE TABLET BY MOUTH ONCE DAILY 90 tablet 3    aspirin 81 MG Chew Take 81 mg by mouth daily as needed.       blood sugar diagnostic (TRUETEST TEST STRIPS) Strp 1 strip by Misc.(Non-Drug; Combo Route) route 2 (two) times daily. 200 strip 3    colchicine 0.6 mg tablet Take 0.6 mg by mouth daily as needed.        "fenofibrate 160 MG Tab TAKE ONE TABLET BY MOUTH ONCE DAILY IN THE MORNING 30 tablet 11    ferrous sulfate 134 mg (27 mg iron) Tab Take 1 tablet by mouth 2 (two) times daily.      glimepiride (AMARYL) 4 MG tablet TAKE ONE TABLET BY MOUTH BEFORE BREAKFAST 90 tablet 3    hydrochlorothiazide (HYDRODIURIL) 25 MG tablet Take 1 tablet (25 mg total) by mouth once daily. 1 Tablet Oral Every day 30 tablet 11    hydrOXYzine pamoate (VISTARIL) 25 MG Cap TAKE 1 CAPSULE BY MOUTH EVERY 8 HOURS AS NEEDED 90 capsule 0    hyoscyamine (LEVSIN/SL) 0.125 mg Subl       lactobacillus combination no.9 (ADULT 50+ PROBIOTIC) 4 billion cell Cap Take by mouth.      loperamide (IMODIUM A-D) 2 mg Tab Take 2 mg by mouth 4 (four) times daily as needed.      metoprolol succinate (TOPROL-XL) 100 MG 24 hr tablet Take 1 tablet (100 mg total) by mouth once daily. 30 tablet 11    multivitamin (THERAGRAN) per tablet Take by mouth. 1 Tablet Oral Every day      mupirocin (BACTROBAN) 2 % ointment Apply topically 3 (three) times daily. 22 g 0    niacin 500 MG Tab Take 100 mg by mouth 2 (two) times daily with meals.       nystatin-triamcinolone (MYCOLOG II) cream       pen needle, diabetic 33 gauge x 5/32" Ndle 1 Units by Misc.(Non-Drug; Combo Route) route once daily. 100 each 3    promethazine (PHENERGAN) 25 MG tablet Take 1 tablet (25 mg total) by mouth every 6 (six) hours as needed for Nausea. 30 tablet 5    simvastatin (ZOCOR) 40 MG tablet TAKE ONE TABLET BY MOUTH ONCE DAILY IN THE EVENING 90 tablet 0    tamsulosin (FLOMAX) 0.4 mg Cp24 Take by mouth. 1 Capsule, Ext Release 24 hr Oral At bedtime      tiZANidine (ZANAFLEX) 2 MG tablet Take 1 tablet (2 mg total) by mouth 6 (six) times daily. 1 Capsule Oral Every day 360 tablet 0    [DISCONTINUED] candesartan (ATACAND) 4 MG tablet Take 1 tablet (4 mg total) by mouth once daily. 90 tablet 3    [DISCONTINUED] metoprolol succinate (TOPROL-XL) 100 MG 24 hr tablet TAKE ONE TABLET BY MOUTH ONCE " DAILY 30 tablet 11    insulin (BASAGLAR KWIKPEN U-100 INSULIN) glargine 100 units/mL (3mL) SubQ pen Inject 20 Units into the skin every evening. 1 Box 11     No current facility-administered medications on file prior to visit.          Review of Systems   Constitutional: Negative for activity change and appetite change.   HENT: Negative for congestion and facial swelling.    Eyes: Negative for pain, discharge and redness.   Respiratory: Positive for cough. Negative for apnea and chest tightness.    Cardiovascular: Negative for chest pain, palpitations and leg swelling.   Gastrointestinal: Negative for abdominal distention.   Genitourinary: Negative for difficulty urinating, dysuria and frequency.   Musculoskeletal: Negative for neck pain and neck stiffness.   Skin: Negative for color change, rash and wound.   Neurological: Negative for dizziness, weakness and numbness.   Psychiatric/Behavioral: Negative for sleep disturbance.   All other systems reviewed and are negative.    :            Objective:           Vitals:    05/27/19 1137   BP: 128/70   Pulse: 74     Weight 213 lbs       Physical Exam   Constitutional: He is oriented to person, place, and time. He appears well-developed and well-nourished. No distress.   HENT:   Head: Normocephalic and atraumatic.   Eyes: Pupils are equal, round, and reactive to light. Right eye exhibits no discharge.   Neck: Normal range of motion. Neck supple. No tracheal deviation present. No thyromegaly present.   Cardiovascular: Normal rate, regular rhythm, normal heart sounds and intact distal pulses. Exam reveals no gallop and no friction rub.   No murmur heard.  Pulmonary/Chest: Effort normal and breath sounds normal. He has no wheezes. He has no rales.   Abdominal: Soft. He exhibits no mass. There is no tenderness. There is no rebound and no guarding.   Obese abdomen    Genitourinary:   Genitourinary Comments: Bilateral nephrostomy tubes in place,   Musculoskeletal: Normal range  of motion. He exhibits no edema.   Lymphadenopathy:     He has no cervical adenopathy.   Neurological: He is alert and oriented to person, place, and time.   Skin: Skin is warm. No rash noted. He is not diaphoretic. No erythema.   Nursing note and vitals reviewed.    :                Labs:    Lab Results   Component Value Date    CREATININE 2.3 (H) 03/06/2019    BUN 30 (H) 03/06/2019     03/06/2019    K 4.3 03/06/2019     03/06/2019    CO2 19 (L) 03/06/2019       Lab Results   Component Value Date    WBC 9.61 10/10/2018    HGB 10.5 (L) 10/10/2018    HCT 33.5 (L) 10/10/2018    MCV 87 10/10/2018     (H) 10/10/2018       Lab Results   Component Value Date    PTH 18.0 10/10/2018    CALCIUM 9.3 03/06/2019    PHOS 3.7 03/06/2019       Impression and Plan :  64-year-old  gentleman seen in office today in follow-up for following medical problems    1.  Chronic kidney disease stage 4 - baseline creatinine about 2.5 mg/dL.  Patient has nephrotic range proteinuria secondary to diabetic nephropathy.  Patient reports recent labs done at urology office, will request the same.    2.  Proteinuria - lisinopril was discontinued due to cough, is currently on Atacand 4 mg daily, patient stopped this medicine 2 weeks ago but still continues to have intermittent cough, not sure if his cough is triggered by some other causes including seasonal allergies, advised patient to try over-the-counter cough medicine like Robitussin, resume Atacand  from tomorrow, if the cough worsens he will discontinue medication, at that point he may need evaluation of his cough, may be an evaluation by allergy and immunologist.    3.  Hypertension - blood pressure is controlled.    Return to clinic in about 2 months as scheduled prior, more than 30 min of face-to-face time was spent with the patient discussing plan of care.  He will inform us in the next few days how he feels resuming  ARB.    Matt Clark MD

## 2019-06-28 ENCOUNTER — LAB VISIT (OUTPATIENT)
Dept: LAB | Facility: HOSPITAL | Age: 64
End: 2019-06-28
Payer: MEDICARE

## 2019-06-28 DIAGNOSIS — N18.4 CKD (CHRONIC KIDNEY DISEASE) STAGE 4, GFR 15-29 ML/MIN: ICD-10-CM

## 2019-06-28 LAB
ALBUMIN SERPL BCP-MCNC: 3.1 G/DL (ref 3.5–5.2)
ANION GAP SERPL CALC-SCNC: 9 MMOL/L (ref 8–16)
BASOPHILS # BLD AUTO: 0.06 K/UL (ref 0–0.2)
BASOPHILS NFR BLD: 0.8 % (ref 0–1.9)
BUN SERPL-MCNC: 36 MG/DL (ref 8–23)
CALCIUM SERPL-MCNC: 10.2 MG/DL (ref 8.7–10.5)
CHLORIDE SERPL-SCNC: 102 MMOL/L (ref 95–110)
CO2 SERPL-SCNC: 24 MMOL/L (ref 23–29)
CREAT SERPL-MCNC: 2.7 MG/DL (ref 0.5–1.4)
DIFFERENTIAL METHOD: ABNORMAL
EOSINOPHIL # BLD AUTO: 0.5 K/UL (ref 0–0.5)
EOSINOPHIL NFR BLD: 6.8 % (ref 0–8)
ERYTHROCYTE [DISTWIDTH] IN BLOOD BY AUTOMATED COUNT: 16.3 % (ref 11.5–14.5)
EST. GFR  (AFRICAN AMERICAN): 27.4 ML/MIN/1.73 M^2
EST. GFR  (NON AFRICAN AMERICAN): 23.7 ML/MIN/1.73 M^2
GLUCOSE SERPL-MCNC: 218 MG/DL (ref 70–110)
HCT VFR BLD AUTO: 32.3 % (ref 40–54)
HGB BLD-MCNC: 9.6 G/DL (ref 14–18)
IMM GRANULOCYTES # BLD AUTO: 0.04 K/UL (ref 0–0.04)
IMM GRANULOCYTES NFR BLD AUTO: 0.5 % (ref 0–0.5)
LYMPHOCYTES # BLD AUTO: 1.8 K/UL (ref 1–4.8)
LYMPHOCYTES NFR BLD: 24.2 % (ref 18–48)
MCH RBC QN AUTO: 24.6 PG (ref 27–31)
MCHC RBC AUTO-ENTMCNC: 29.7 G/DL (ref 32–36)
MCV RBC AUTO: 83 FL (ref 82–98)
MONOCYTES # BLD AUTO: 0.6 K/UL (ref 0.3–1)
MONOCYTES NFR BLD: 7.8 % (ref 4–15)
NEUTROPHILS # BLD AUTO: 4.5 K/UL (ref 1.8–7.7)
NEUTROPHILS NFR BLD: 59.9 % (ref 38–73)
NRBC BLD-RTO: 0 /100 WBC
PHOSPHATE SERPL-MCNC: 4.3 MG/DL (ref 2.7–4.5)
PLATELET # BLD AUTO: 433 K/UL (ref 150–350)
PMV BLD AUTO: 10.4 FL (ref 9.2–12.9)
POTASSIUM SERPL-SCNC: 4.7 MMOL/L (ref 3.5–5.1)
RBC # BLD AUTO: 3.91 M/UL (ref 4.6–6.2)
SODIUM SERPL-SCNC: 135 MMOL/L (ref 136–145)
WBC # BLD AUTO: 7.52 K/UL (ref 3.9–12.7)

## 2019-06-28 PROCEDURE — 36415 COLL VENOUS BLD VENIPUNCTURE: CPT | Mod: PO

## 2019-06-28 PROCEDURE — 83970 ASSAY OF PARATHORMONE: CPT

## 2019-06-28 PROCEDURE — 80069 RENAL FUNCTION PANEL: CPT

## 2019-06-28 PROCEDURE — 85025 COMPLETE CBC W/AUTO DIFF WBC: CPT

## 2019-06-29 LAB — PTH-INTACT SERPL-MCNC: 35 PG/ML (ref 9–77)

## 2019-07-01 ENCOUNTER — TELEPHONE (OUTPATIENT)
Dept: NEPHROLOGY | Facility: CLINIC | Age: 64
End: 2019-07-01

## 2019-07-01 NOTE — TELEPHONE ENCOUNTER
----- Message from Rekha Hall sent at 7/1/2019  4:12 PM CDT -----  Contact: self [phone]  Type:  Sooner Apoointment Request    Caller is requesting a sooner appointment.  Caller declined first available appointment listed below.  Caller will not accept being placed on the waitlist and is requesting a message be sent to doctor.  Name of Caller:Rickey Amaya  When is the first available appointment?08/26/19  Symptoms:4 mo fu  Would the patient rather a call back or a response via [facility]? Call back   Best Call Back Number:[phone]  Additional Information:

## 2019-07-22 RX ORDER — MUPIROCIN 20 MG/G
OINTMENT TOPICAL
Qty: 22 G | Refills: 2 | Status: SHIPPED | OUTPATIENT
Start: 2019-07-22

## 2019-07-31 ENCOUNTER — OFFICE VISIT (OUTPATIENT)
Dept: NEPHROLOGY | Facility: CLINIC | Age: 64
End: 2019-07-31
Payer: MEDICARE

## 2019-07-31 VITALS
WEIGHT: 211.88 LBS | HEART RATE: 72 BPM | BODY MASS INDEX: 34.05 KG/M2 | HEIGHT: 66 IN | DIASTOLIC BLOOD PRESSURE: 64 MMHG | SYSTOLIC BLOOD PRESSURE: 122 MMHG

## 2019-07-31 DIAGNOSIS — N18.4 CKD (CHRONIC KIDNEY DISEASE) STAGE 4, GFR 15-29 ML/MIN: Primary | ICD-10-CM

## 2019-07-31 DIAGNOSIS — R80.9 PROTEINURIA DUE TO TYPE 2 DIABETES MELLITUS: ICD-10-CM

## 2019-07-31 DIAGNOSIS — E11.29 PROTEINURIA DUE TO TYPE 2 DIABETES MELLITUS: ICD-10-CM

## 2019-07-31 PROCEDURE — 99214 OFFICE O/P EST MOD 30 MIN: CPT | Mod: S$GLB,,, | Performed by: INTERNAL MEDICINE

## 2019-07-31 PROCEDURE — 3046F PR MOST RECENT HEMOGLOBIN A1C LEVEL > 9.0%: ICD-10-PCS | Mod: CPTII,S$GLB,, | Performed by: INTERNAL MEDICINE

## 2019-07-31 PROCEDURE — 99214 PR OFFICE/OUTPT VISIT, EST, LEVL IV, 30-39 MIN: ICD-10-PCS | Mod: S$GLB,,, | Performed by: INTERNAL MEDICINE

## 2019-07-31 PROCEDURE — 1101F PR PT FALLS ASSESS DOC 0-1 FALLS W/OUT INJ PAST YR: ICD-10-PCS | Mod: CPTII,S$GLB,, | Performed by: INTERNAL MEDICINE

## 2019-07-31 PROCEDURE — 99499 UNLISTED E&M SERVICE: CPT | Mod: S$GLB,,, | Performed by: INTERNAL MEDICINE

## 2019-07-31 PROCEDURE — 3046F HEMOGLOBIN A1C LEVEL >9.0%: CPT | Mod: CPTII,S$GLB,, | Performed by: INTERNAL MEDICINE

## 2019-07-31 PROCEDURE — 99999 PR PBB SHADOW E&M-EST. PATIENT-LVL III: ICD-10-PCS | Mod: PBBFAC,,, | Performed by: INTERNAL MEDICINE

## 2019-07-31 PROCEDURE — 3074F SYST BP LT 130 MM HG: CPT | Mod: CPTII,S$GLB,, | Performed by: INTERNAL MEDICINE

## 2019-07-31 PROCEDURE — 3078F PR MOST RECENT DIASTOLIC BLOOD PRESSURE < 80 MM HG: ICD-10-PCS | Mod: CPTII,S$GLB,, | Performed by: INTERNAL MEDICINE

## 2019-07-31 PROCEDURE — 3008F PR BODY MASS INDEX (BMI) DOCUMENTED: ICD-10-PCS | Mod: CPTII,S$GLB,, | Performed by: INTERNAL MEDICINE

## 2019-07-31 PROCEDURE — 3008F BODY MASS INDEX DOCD: CPT | Mod: CPTII,S$GLB,, | Performed by: INTERNAL MEDICINE

## 2019-07-31 PROCEDURE — 3078F DIAST BP <80 MM HG: CPT | Mod: CPTII,S$GLB,, | Performed by: INTERNAL MEDICINE

## 2019-07-31 PROCEDURE — 3074F PR MOST RECENT SYSTOLIC BLOOD PRESSURE < 130 MM HG: ICD-10-PCS | Mod: CPTII,S$GLB,, | Performed by: INTERNAL MEDICINE

## 2019-07-31 PROCEDURE — 1101F PT FALLS ASSESS-DOCD LE1/YR: CPT | Mod: CPTII,S$GLB,, | Performed by: INTERNAL MEDICINE

## 2019-07-31 PROCEDURE — 99999 PR PBB SHADOW E&M-EST. PATIENT-LVL III: CPT | Mod: PBBFAC,,, | Performed by: INTERNAL MEDICINE

## 2019-07-31 PROCEDURE — 99499 RISK ADDL DX/OHS AUDIT: ICD-10-PCS | Mod: S$GLB,,, | Performed by: INTERNAL MEDICINE

## 2019-07-31 NOTE — PROGRESS NOTES
PROGRESS NOTE FOR ESTABLISHED PATIENT    PHYSICIAN REQUESTING THE CONSULT: Dr. Octavio Rouse    REASON FOR CONSULTATION: Renal insufficiency    64 y.o. male with history of retroperitoneal fibrosis, HTN, gout, colon cancer, DM2, nephrolithiasis, anemia, incontinence presents to the renal clinic for evaluation of renal insufficiency.     Patient today presents with bilateral nephrostomy tubes and bilateral ureteral stents. He has follow-up with Dr. So (Urology) on regular basis. Now on Candesartan for proteinuria.           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     DM type 2 with diabetic mixed hyperlipidemia     History of colon cancer 2005    History of gout     Hyperlipidemia associated with type 2 diabetes mellitus     Hypertension associated with diabetes     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       Past Surgical History:   Procedure Laterality Date    s/p exp lap times three      complications of colon cancer surgery    S/P partial coloectomy      with colostomy d/t colon cancer       Review of patient's allergies indicates:   Allergen Reactions    Hydrocodone-acetaminophen      Other reaction(s): Rash    Metformin      Advised no metformin due to kidneys    Diazepam Itching and Rash     Other reaction(s): Itching  Other reaction(s): Rash    Morphine Rash     Other reaction(s): Rash  Other reaction(s): Rash  Other reaction(s): Rash    Propoxyphene n-acetaminophen Rash     Other reaction(s): Rash    Propoxyphene-acetaminophen Rash       Current Outpatient Medications   Medication Sig Dispense Refill    allopurinol (ZYLOPRIM) 300 MG tablet TAKE ONE TABLET BY MOUTH ONCE DAILY 90 tablet  "3    aspirin 81 MG Chew Take 81 mg by mouth daily as needed.       blood sugar diagnostic (TRUETEST TEST STRIPS) Strp 1 strip by Misc.(Non-Drug; Combo Route) route 2 (two) times daily. 200 strip 3    candesartan (ATACAND) 4 MG tablet Take 1 tablet (4 mg total) by mouth once daily. 90 tablet 3    colchicine 0.6 mg tablet Take 0.6 mg by mouth daily as needed.       fenofibrate 160 MG Tab TAKE ONE TABLET BY MOUTH ONCE DAILY IN THE MORNING 30 tablet 11    ferrous sulfate 134 mg (27 mg iron) Tab Take 1 tablet by mouth 2 (two) times daily.      glimepiride (AMARYL) 4 MG tablet TAKE ONE TABLET BY MOUTH BEFORE BREAKFAST 90 tablet 3    hydrochlorothiazide (HYDRODIURIL) 25 MG tablet Take 1 tablet (25 mg total) by mouth once daily. 1 Tablet Oral Every day 30 tablet 11    hydrOXYzine pamoate (VISTARIL) 25 MG Cap TAKE 1 CAPSULE BY MOUTH EVERY 8 HOURS AS NEEDED 90 capsule 0    hyoscyamine (LEVSIN/SL) 0.125 mg Subl       insulin (BASAGLAR KWIKPEN U-100 INSULIN) glargine 100 units/mL (3mL) SubQ pen Inject 20 Units into the skin every evening. 1 Box 11    lactobacillus combination no.9 (ADULT 50+ PROBIOTIC) 4 billion cell Cap Take by mouth.      loperamide (IMODIUM A-D) 2 mg Tab Take 2 mg by mouth 4 (four) times daily as needed.      metoprolol succinate (TOPROL-XL) 100 MG 24 hr tablet Take 1 tablet (100 mg total) by mouth once daily. 30 tablet 11    multivitamin (THERAGRAN) per tablet Take by mouth. 1 Tablet Oral Every day      mupirocin (BACTROBAN) 2 % ointment APPLY  OINTMENT EXTERNALLY THREE TIMES DAILY 22 g 2    niacin 500 MG Tab Take 100 mg by mouth 2 (two) times daily with meals.       nystatin-triamcinolone (MYCOLOG II) cream       pen needle, diabetic 33 gauge x 5/32" Ndle 1 Units by Misc.(Non-Drug; Combo Route) route once daily. 100 each 3    promethazine (PHENERGAN) 25 MG tablet Take 1 tablet (25 mg total) by mouth every 6 (six) hours as needed for Nausea. 30 tablet 5    simvastatin (ZOCOR) 40 MG " tablet TAKE ONE TABLET BY MOUTH ONCE DAILY IN THE EVENING 90 tablet 0    tamsulosin (FLOMAX) 0.4 mg Cp24 Take by mouth. 1 Capsule, Ext Release 24 hr Oral At bedtime      tiZANidine (ZANAFLEX) 2 MG tablet Take 1 tablet (2 mg total) by mouth 6 (six) times daily. 1 Capsule Oral Every day 360 tablet 0     No current facility-administered medications for this visit.        Family History   Problem Relation Age of Onset    Diabetes Mother        Social History     Socioeconomic History    Marital status:      Spouse name: Not on file    Number of children: Not on file    Years of education: Not on file    Highest education level: Not on file   Occupational History    Not on file   Social Needs    Financial resource strain: Not on file    Food insecurity:     Worry: Not on file     Inability: Not on file    Transportation needs:     Medical: Not on file     Non-medical: Not on file   Tobacco Use    Smoking status: Former Smoker     Packs/day: 2.50     Years: 35.00     Pack years: 87.50     Types: Cigarettes     Last attempt to quit: 2005     Years since quittin.0    Smokeless tobacco: Never Used   Substance and Sexual Activity    Alcohol use: No    Drug use: No    Sexual activity: Never   Lifestyle    Physical activity:     Days per week: Not on file     Minutes per session: Not on file    Stress: Not on file   Relationships    Social connections:     Talks on phone: Not on file     Gets together: Not on file     Attends Synagogue service: Not on file     Active member of club or organization: Not on file     Attends meetings of clubs or organizations: Not on file     Relationship status: Not on file   Other Topics Concern    Not on file   Social History Narrative    Not on file       Review of Systems:  1. GENERAL: patient denies any fever, weight changes, generalized weakness, dizziness.  2. HEENT: patient denies headaches, visual disturbances, swallowing problems, sinus pain, nasal  "congestion.  3. CARDIOVASCULAR: patient denies chest pain, palpitations.  4. PULMONARY: patient denies SOB, coughing, hemoptysis, wheezing.  5. GASTROINTESTINAL: patient denies abdominal pain, nausea, vomiting, diarrhea, colostomy bag in place  6. GENITOURINARY: patient denies dysuria, hematuria no hesitancy, frequency.  7. EXTREMITIES: patient denies LE edema, LE cramping.  8. DERMATOLOGY: patient denies rashes, ulcers.  9. NEURO: patient denies tremors, extremity weakness, extremity numbness/tingling.  10. MUSCULOSKELETAL: patient denies joint pain, joint swelling.  11. HEMATOLOGY: patient denies rectal or gum bleeding.  12: PSYCH: patient denies anxiety, depression.      PHYSICAL EXAM:    /64   Pulse 72   Ht 5' 6" (1.676 m)   Wt 96.1 kg (211 lb 13.8 oz)   BMI 34.20 kg/m²     GENERAL: Pleasant gentleman presents to clinic with non-labored breathing.  HEENT: PER, no nasal discharge, no icterus, no oral exudates, moist mucosal membranes, bilateral shoulder fat pads  NECK: no thyroid mass, no lymphadenopathy.  HEART: RRR S1/S2, no rubs, good peripheral pulses.  LUNGS: CTA bilaterally, no wheezing, breathing is nonlabored.  ABDOMEN: soft, nontender, not distended, bowel sounds are present, no abdominal hernia, right sided colostomy (with bag), bilateral nephrostomy tubes in place  EXTREM: mild bilateral ankle edema.  SKIN: no rashes, skin is warm and dry.  NEURO: A & O x 3, no obvious focal signs.    LABORATORY RESULTS:    Lab Results   Component Value Date    CREATININE 2.7 (H) 06/28/2019    BUN 36 (H) 06/28/2019     (L) 06/28/2019    K 4.7 06/28/2019     06/28/2019    CO2 24 06/28/2019      Lab Results   Component Value Date    PTH 35.0 06/28/2019    CALCIUM 10.2 06/28/2019    PHOS 4.3 06/28/2019     Lab Results   Component Value Date    ALBUMIN 3.1 (L) 06/28/2019     Lab Results   Component Value Date    WBC 7.52 06/28/2019    HGB 9.6 (L) 06/28/2019    HCT 32.3 (L) 06/28/2019    MCV 83 " 06/28/2019     (H) 06/28/2019     Protein Creatinine Ratios:    Creatinine, Random Ur   Date Value Ref Range Status   06/28/2019 87.0 23.0 - 375.0 mg/dL Final     Comment:     The random urine reference ranges provided were established   for 24 hour urine collections.  No reference ranges exist for  random urine specimens.  Correlate clinically.     12/05/2018 91.0 23.0 - 375.0 mg/dL Final     Comment:     The random urine reference ranges provided were established   for 24 hour urine collections.  No reference ranges exist for  random urine specimens.  Correlate clinically.     10/10/2018 113.0 23.0 - 375.0 mg/dL Final     Comment:     The random urine reference ranges provided were established   for 24 hour urine collections.  No reference ranges exist for  random urine specimens.  Correlate clinically.       Protein, Urine Random   Date Value Ref Range Status   06/28/2019 714 (H) 0 - 15 mg/dL Final     Comment:     The random urine reference ranges provided were established   for 24 hour urine collections.  No reference ranges exist for  random urine specimens.  Correlate clinically.     12/05/2018 341 (H) 0 - 15 mg/dL Final     Comment:     The random urine reference ranges provided were established   for 24 hour urine collections.  No reference ranges exist for  random urine specimens.  Correlate clinically.     10/10/2018 814 (H) 0 - 15 mg/dL Final     Comment:     The random urine reference ranges provided were established   for 24 hour urine collections.  No reference ranges exist for  random urine specimens.  Correlate clinically.       Prot/Creat Ratio, Ur   Date Value Ref Range Status   06/28/2019 8.21 (H) 0.00 - 0.20 Final   12/05/2018 3.75 (H) 0.00 - 0.20 Final   10/10/2018 7.20 (H) 0.00 - 0.20 Final           ASSESSMENT AND PLAN:  64 y.o. male with history of retroperitoneal fibrosis, HTN, gout, colon cancer, DM2, nephrolithiasis, anemia, incontinence presents to the renal clinic for evaluation  of renal insufficiency.    1. Renal insufficiency: Patient presents with renal insufficiency, consistent with CKD stage 4. Last creatinine was 2.7. Cause of his renal insufficiency is likely multifactorial and related to his long-standing diabetes (he presents with proteinuria) and retroperitoneal fibrosis.  He is on Candesartan for proteinuria.  He presents with bilateral ureteral stents. He also has bilateral nephrostomy tubes in place. Left nephrostomy tube is supposed to be removed by Dr. So in near future. Patient was advised to avoid NSAID pain medications such as advil, aleve, motrin, ibuprofen, naprosyn, meloxicam, diclofenac, mobic and to hydrate with 60-80 ounces of water per day.     2. Electrolytes: at goal.     3. Acid base status: mild acidosis has resolved.    4. Volume: Mild LE edema. Monitor.     5. Hypertension: good BP control.     6. Medications: Reviewed.     7. DM2: continue Insulin (Victoza was stopped).     8. Retroperitoneal fibrosis: he has regular follow-up with Dr. So (Urology) for ureteral stent changes. He also has bilateral nephrostomy tubes in place.

## 2019-08-22 ENCOUNTER — TELEPHONE (OUTPATIENT)
Dept: INTERNAL MEDICINE | Facility: CLINIC | Age: 64
End: 2019-08-22

## 2019-08-22 RX ORDER — INSULIN GLARGINE 100 [IU]/ML
26 INJECTION, SOLUTION SUBCUTANEOUS NIGHTLY
Qty: 1 BOX | Refills: 11 | Status: SHIPPED | OUTPATIENT
Start: 2019-08-22 | End: 2019-09-20

## 2019-08-22 NOTE — TELEPHONE ENCOUNTER
----- Message from Melisa Solis sent at 8/22/2019 10:07 AM CDT -----  Contact: Pt  Pt called in regards to insulin shots. Pt stated that he needed to speak with the staff about his insulin. Pt can be reached at [phone] (ebua) .

## 2019-08-22 NOTE — TELEPHONE ENCOUNTER
Patient states that he was given script for Basaglar insulin for 20 units he has been taking 26 units and has run out needs new script sent in for the 26 units. William Quezada.

## 2019-09-13 ENCOUNTER — TELEPHONE (OUTPATIENT)
Dept: INTERNAL MEDICINE | Facility: CLINIC | Age: 64
End: 2019-09-13

## 2019-09-13 NOTE — TELEPHONE ENCOUNTER
----- Message from Ewa Cline sent at 9/13/2019  9:42 AM CDT -----  Patient needs call back to [facility] f/u..[phone] (home)

## 2019-09-16 ENCOUNTER — TELEPHONE (OUTPATIENT)
Dept: INTERNAL MEDICINE | Facility: CLINIC | Age: 64
End: 2019-09-16

## 2019-09-16 NOTE — TELEPHONE ENCOUNTER
----- Message from Melisa Solis sent at 9/16/2019  9:02 AM CDT -----  Contact: Pt  Pt called in regards to speaking with the staff in regards to a appointment. Pt stated that he needed to speak with Dr. Rouse regarding several issues and the next available time was not a good time for him due to the fact that he had to administer antibiotics at home for 2. Pt can be reached at [phone] (zeij)

## 2019-09-19 ENCOUNTER — OFFICE VISIT (OUTPATIENT)
Dept: INTERNAL MEDICINE | Facility: CLINIC | Age: 64
End: 2019-09-19
Payer: MEDICARE

## 2019-09-19 ENCOUNTER — LAB VISIT (OUTPATIENT)
Dept: LAB | Facility: HOSPITAL | Age: 64
End: 2019-09-19
Attending: INTERNAL MEDICINE
Payer: MEDICARE

## 2019-09-19 VITALS
WEIGHT: 205.94 LBS | BODY MASS INDEX: 33.23 KG/M2 | OXYGEN SATURATION: 98 % | DIASTOLIC BLOOD PRESSURE: 66 MMHG | SYSTOLIC BLOOD PRESSURE: 124 MMHG | TEMPERATURE: 99 F | HEART RATE: 73 BPM

## 2019-09-19 DIAGNOSIS — E11.8 TYPE 2 DIABETES MELLITUS WITH COMPLICATION, WITHOUT LONG-TERM CURRENT USE OF INSULIN: ICD-10-CM

## 2019-09-19 DIAGNOSIS — N18.30 ANEMIA IN STAGE 3 CHRONIC KIDNEY DISEASE: ICD-10-CM

## 2019-09-19 DIAGNOSIS — I13.10: ICD-10-CM

## 2019-09-19 DIAGNOSIS — Z79.4 TYPE 2 DIABETES MELLITUS WITH STAGE 4 CHRONIC KIDNEY DISEASE, WITH LONG-TERM CURRENT USE OF INSULIN: ICD-10-CM

## 2019-09-19 DIAGNOSIS — E11.69 DM TYPE 2 WITH DIABETIC MIXED HYPERLIPIDEMIA: ICD-10-CM

## 2019-09-19 DIAGNOSIS — Z93.3 COLOSTOMY STATUS: ICD-10-CM

## 2019-09-19 DIAGNOSIS — N18.4 CKD (CHRONIC KIDNEY DISEASE), STAGE IV: ICD-10-CM

## 2019-09-19 DIAGNOSIS — Z87.39 HISTORY OF GOUT: ICD-10-CM

## 2019-09-19 DIAGNOSIS — D63.1 ANEMIA IN STAGE 3 CHRONIC KIDNEY DISEASE: ICD-10-CM

## 2019-09-19 DIAGNOSIS — Z85.038 HISTORY OF COLON CANCER: ICD-10-CM

## 2019-09-19 DIAGNOSIS — N18.4: ICD-10-CM

## 2019-09-19 DIAGNOSIS — E11.69 DM TYPE 2 WITH DIABETIC MIXED HYPERLIPIDEMIA: Primary | ICD-10-CM

## 2019-09-19 DIAGNOSIS — E78.2 DM TYPE 2 WITH DIABETIC MIXED HYPERLIPIDEMIA: Primary | ICD-10-CM

## 2019-09-19 DIAGNOSIS — N18.4 TYPE 2 DIABETES MELLITUS WITH STAGE 4 CHRONIC KIDNEY DISEASE, WITH LONG-TERM CURRENT USE OF INSULIN: ICD-10-CM

## 2019-09-19 DIAGNOSIS — E11.59 HYPERTENSION ASSOCIATED WITH DIABETES: ICD-10-CM

## 2019-09-19 DIAGNOSIS — K68.2 RETROPERITONEAL FIBROSIS: ICD-10-CM

## 2019-09-19 DIAGNOSIS — E78.2 DM TYPE 2 WITH DIABETIC MIXED HYPERLIPIDEMIA: ICD-10-CM

## 2019-09-19 DIAGNOSIS — I15.2 HYPERTENSION ASSOCIATED WITH DIABETES: ICD-10-CM

## 2019-09-19 DIAGNOSIS — E11.22 TYPE 2 DIABETES MELLITUS WITH STAGE 4 CHRONIC KIDNEY DISEASE, WITH LONG-TERM CURRENT USE OF INSULIN: ICD-10-CM

## 2019-09-19 LAB
BASOPHILS # BLD AUTO: 0.05 K/UL (ref 0–0.2)
BASOPHILS NFR BLD: 0.7 % (ref 0–1.9)
DIFFERENTIAL METHOD: ABNORMAL
EOSINOPHIL # BLD AUTO: 0.5 K/UL (ref 0–0.5)
EOSINOPHIL NFR BLD: 6.4 % (ref 0–8)
ERYTHROCYTE [DISTWIDTH] IN BLOOD BY AUTOMATED COUNT: 17.1 % (ref 11.5–14.5)
ESTIMATED AVG GLUCOSE: 232 MG/DL (ref 68–131)
HBA1C MFR BLD HPLC: 9.7 % (ref 4–5.6)
HCT VFR BLD AUTO: 26.5 % (ref 40–54)
HGB BLD-MCNC: 7.7 G/DL (ref 14–18)
IMM GRANULOCYTES # BLD AUTO: 0.07 K/UL (ref 0–0.04)
IMM GRANULOCYTES NFR BLD AUTO: 0.9 % (ref 0–0.5)
LYMPHOCYTES # BLD AUTO: 1.8 K/UL (ref 1–4.8)
LYMPHOCYTES NFR BLD: 23.6 % (ref 18–48)
MCH RBC QN AUTO: 25 PG (ref 27–31)
MCHC RBC AUTO-ENTMCNC: 29.1 G/DL (ref 32–36)
MCV RBC AUTO: 86 FL (ref 82–98)
MONOCYTES # BLD AUTO: 0.5 K/UL (ref 0.3–1)
MONOCYTES NFR BLD: 6.7 % (ref 4–15)
NEUTROPHILS # BLD AUTO: 4.6 K/UL (ref 1.8–7.7)
NEUTROPHILS NFR BLD: 61.7 % (ref 38–73)
NRBC BLD-RTO: 0 /100 WBC
PLATELET # BLD AUTO: 472 K/UL (ref 150–350)
PMV BLD AUTO: 10 FL (ref 9.2–12.9)
RBC # BLD AUTO: 3.08 M/UL (ref 4.6–6.2)
WBC # BLD AUTO: 7.51 K/UL (ref 3.9–12.7)

## 2019-09-19 PROCEDURE — 80053 COMPREHEN METABOLIC PANEL: CPT

## 2019-09-19 PROCEDURE — 85025 COMPLETE CBC W/AUTO DIFF WBC: CPT

## 2019-09-19 PROCEDURE — 90662 FLU VACCINE - HIGH DOSE (65+) PRESERVATIVE FREE IM: ICD-10-PCS | Mod: S$GLB,,, | Performed by: INTERNAL MEDICINE

## 2019-09-19 PROCEDURE — 99999 PR PBB SHADOW E&M-EST. PATIENT-LVL III: CPT | Mod: PBBFAC,,, | Performed by: INTERNAL MEDICINE

## 2019-09-19 PROCEDURE — G0008 FLU VACCINE - HIGH DOSE (65+) PRESERVATIVE FREE IM: ICD-10-PCS | Mod: S$GLB,,, | Performed by: INTERNAL MEDICINE

## 2019-09-19 PROCEDURE — 84443 ASSAY THYROID STIM HORMONE: CPT

## 2019-09-19 PROCEDURE — 3008F BODY MASS INDEX DOCD: CPT | Mod: CPTII,S$GLB,, | Performed by: INTERNAL MEDICINE

## 2019-09-19 PROCEDURE — 1101F PR PT FALLS ASSESS DOC 0-1 FALLS W/OUT INJ PAST YR: ICD-10-PCS | Mod: CPTII,S$GLB,, | Performed by: INTERNAL MEDICINE

## 2019-09-19 PROCEDURE — 3046F PR MOST RECENT HEMOGLOBIN A1C LEVEL > 9.0%: ICD-10-PCS | Mod: CPTII,S$GLB,, | Performed by: INTERNAL MEDICINE

## 2019-09-19 PROCEDURE — 90662 IIV NO PRSV INCREASED AG IM: CPT | Mod: S$GLB,,, | Performed by: INTERNAL MEDICINE

## 2019-09-19 PROCEDURE — 99499 UNLISTED E&M SERVICE: CPT | Mod: S$GLB,,, | Performed by: INTERNAL MEDICINE

## 2019-09-19 PROCEDURE — 99999 PR PBB SHADOW E&M-EST. PATIENT-LVL III: ICD-10-PCS | Mod: PBBFAC,,, | Performed by: INTERNAL MEDICINE

## 2019-09-19 PROCEDURE — 3078F DIAST BP <80 MM HG: CPT | Mod: CPTII,S$GLB,, | Performed by: INTERNAL MEDICINE

## 2019-09-19 PROCEDURE — G0008 ADMIN INFLUENZA VIRUS VAC: HCPCS | Mod: S$GLB,,, | Performed by: INTERNAL MEDICINE

## 2019-09-19 PROCEDURE — 3046F HEMOGLOBIN A1C LEVEL >9.0%: CPT | Mod: CPTII,S$GLB,, | Performed by: INTERNAL MEDICINE

## 2019-09-19 PROCEDURE — 99499 RISK ADDL DX/OHS AUDIT: ICD-10-PCS | Mod: S$GLB,,, | Performed by: INTERNAL MEDICINE

## 2019-09-19 PROCEDURE — 3078F PR MOST RECENT DIASTOLIC BLOOD PRESSURE < 80 MM HG: ICD-10-PCS | Mod: CPTII,S$GLB,, | Performed by: INTERNAL MEDICINE

## 2019-09-19 PROCEDURE — 99214 OFFICE O/P EST MOD 30 MIN: CPT | Mod: 25,S$GLB,, | Performed by: INTERNAL MEDICINE

## 2019-09-19 PROCEDURE — 99214 PR OFFICE/OUTPT VISIT, EST, LEVL IV, 30-39 MIN: ICD-10-PCS | Mod: 25,S$GLB,, | Performed by: INTERNAL MEDICINE

## 2019-09-19 PROCEDURE — 3074F SYST BP LT 130 MM HG: CPT | Mod: CPTII,S$GLB,, | Performed by: INTERNAL MEDICINE

## 2019-09-19 PROCEDURE — 1101F PT FALLS ASSESS-DOCD LE1/YR: CPT | Mod: CPTII,S$GLB,, | Performed by: INTERNAL MEDICINE

## 2019-09-19 PROCEDURE — 3008F PR BODY MASS INDEX (BMI) DOCUMENTED: ICD-10-PCS | Mod: CPTII,S$GLB,, | Performed by: INTERNAL MEDICINE

## 2019-09-19 PROCEDURE — 36415 COLL VENOUS BLD VENIPUNCTURE: CPT | Mod: PO

## 2019-09-19 PROCEDURE — 83036 HEMOGLOBIN GLYCOSYLATED A1C: CPT

## 2019-09-19 PROCEDURE — 3074F PR MOST RECENT SYSTOLIC BLOOD PRESSURE < 130 MM HG: ICD-10-PCS | Mod: CPTII,S$GLB,, | Performed by: INTERNAL MEDICINE

## 2019-09-19 RX ORDER — SIMVASTATIN 40 MG/1
TABLET, FILM COATED ORAL
Qty: 90 TABLET | Refills: 3 | Status: SHIPPED | OUTPATIENT
Start: 2019-09-19 | End: 2020-12-16

## 2019-09-19 RX ORDER — SODIUM BICARBONATE 650 MG/1
1300 TABLET ORAL 2 TIMES DAILY
Refills: 0 | COMMUNITY
Start: 2019-09-13

## 2019-09-19 NOTE — PROGRESS NOTES
Administered high dose flu shot to right dletoid.  See immunization record.  Pt tolerated well.  Advised to wait at least 15 minutes to monitor for adverse reactions.  Pt verbalizes understanding.  VIS given    ndc  36510-885-61  Lot  [id number]  Exp  4-

## 2019-09-19 NOTE — PROGRESS NOTES
HPI:  Patient is a 64-year-old man who comes in today for follow-up after hospital stay.  He was admitted for sepsis due to urinary tract infection.  He has problems with retroperitoneal fibrosis requiring stents.  One of the stents had to be replaced in removed.  He now has stents in both sides.  He is receiving IV antibiotics via PICC line.  Unfortunately, I do not have access to any of his hospitalized records.  He was taken off many medications while he was in the hospital.  He does another reasoning behind that.  Patient is been noncompliant with follow-up.  I saw him in January and wanted to seen 3 months later.  It now has been 9 months since he has been seen.  He does not check his blood sugar regular basis.  He denies any hypoglycemic events.  He states his most recent creatinine was up to 4.6 while he was in the hospital.  His baseline creatinine is the mid 2 range    Current meds have been verified and updated per the EMR  Exam:/66   Pulse 73   Temp 98.6 °F (37 °C)   Wt 93.4 kg (205 lb 14.6 oz)   SpO2 98%   BMI 33.23 kg/m²   Chest clear  Cardiovascular regular rate and rhythm without murmur gallop or rub  Abdomen soft and benign        Impression:  Multiple medical problems below  Patient Active Problem List   Diagnosis    Calculus of kidney    History of colon cancer    Retroperitoneal fibrosis    DM type 2 with diabetic mixed hyperlipidemia    Colostomy status    Hypertension associated with diabetes    DM (diabetes mellitus), type 2 with complications    History of gout    Anemia in CKD (chronic kidney disease)    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  He will have lab work done today.  We will call him with results tomorrow.  I strongly encouraged him to use [facility] so have access to records.  He referred to use the [facility].  I told he needs to consider changing positions to a primary care doctor that has access to the [facility] records.  We also discussed his noncompliance with follow-up.  I explained to him his really only hurting himself in no one else      This note is generated with speech recognition software and is subject to transcription error and sound alike phrases that may be missed by proofreading.

## 2019-09-20 ENCOUNTER — TELEPHONE (OUTPATIENT)
Dept: INTERNAL MEDICINE | Facility: CLINIC | Age: 64
End: 2019-09-20

## 2019-09-20 DIAGNOSIS — E11.8 TYPE 2 DIABETES MELLITUS WITH COMPLICATION, WITHOUT LONG-TERM CURRENT USE OF INSULIN: Primary | ICD-10-CM

## 2019-09-20 LAB
ALBUMIN SERPL BCP-MCNC: 2.6 G/DL (ref 3.5–5.2)
ALP SERPL-CCNC: 49 U/L (ref 55–135)
ALT SERPL W/O P-5'-P-CCNC: 13 U/L (ref 10–44)
ANION GAP SERPL CALC-SCNC: 10 MMOL/L (ref 8–16)
AST SERPL-CCNC: 19 U/L (ref 10–40)
BILIRUB SERPL-MCNC: 0.2 MG/DL (ref 0.1–1)
BUN SERPL-MCNC: 34 MG/DL (ref 8–23)
CALCIUM SERPL-MCNC: 9.2 MG/DL (ref 8.7–10.5)
CHLORIDE SERPL-SCNC: 105 MMOL/L (ref 95–110)
CO2 SERPL-SCNC: 24 MMOL/L (ref 23–29)
CREAT SERPL-MCNC: 2.2 MG/DL (ref 0.5–1.4)
EST. GFR  (AFRICAN AMERICAN): 35 ML/MIN/1.73 M^2
EST. GFR  (NON AFRICAN AMERICAN): 30.3 ML/MIN/1.73 M^2
GLUCOSE SERPL-MCNC: 149 MG/DL (ref 70–110)
POTASSIUM SERPL-SCNC: 4.9 MMOL/L (ref 3.5–5.1)
PROT SERPL-MCNC: 7.4 G/DL (ref 6–8.4)
SODIUM SERPL-SCNC: 139 MMOL/L (ref 136–145)
TSH SERPL DL<=0.005 MIU/L-ACNC: 1.7 UIU/ML (ref 0.4–4)

## 2019-09-20 RX ORDER — INSULIN GLARGINE 100 [IU]/ML
35 INJECTION, SOLUTION SUBCUTANEOUS NIGHTLY
Qty: 1 BOX | Refills: 11 | Status: SHIPPED | OUTPATIENT
Start: 2019-09-20 | End: 2020-12-16

## 2019-09-20 RX ORDER — GLIMEPIRIDE 2 MG/1
2 TABLET ORAL
Qty: 90 TABLET | Refills: 3 | Status: SHIPPED | OUTPATIENT
Start: 2019-09-20 | End: 2020-12-16

## 2019-09-20 NOTE — TELEPHONE ENCOUNTER
Called patient regarding lab results. Advised patient to get back on Glimepiride 2mg and increase Insulin to 35 units every evening. Scheduled follow up labs and visit in December

## 2019-09-20 NOTE — TELEPHONE ENCOUNTER
Your lab work shows your creatinine is back down to 2.2 which is better than it usually is. Your diabetes test, HgbA1c, was 9.7 showing very poor control. I want you to restart the glimepiride 2 mg daily and to increase your insulin to 35 units every night. I want to repeat your lab and SEE YOU in three mths.

## 2020-02-19 ENCOUNTER — PATIENT OUTREACH (OUTPATIENT)
Dept: ADMINISTRATIVE | Facility: HOSPITAL | Age: 65
End: 2020-02-19

## 2020-03-13 ENCOUNTER — PATIENT OUTREACH (OUTPATIENT)
Dept: ADMINISTRATIVE | Facility: HOSPITAL | Age: 65
End: 2020-03-13

## 2020-06-26 DIAGNOSIS — E11.9 TYPE 2 DIABETES MELLITUS WITHOUT COMPLICATION: ICD-10-CM

## 2020-08-26 ENCOUNTER — PATIENT OUTREACH (OUTPATIENT)
Dept: ADMINISTRATIVE | Facility: HOSPITAL | Age: 65
End: 2020-08-26

## 2020-09-10 ENCOUNTER — PATIENT OUTREACH (OUTPATIENT)
Dept: ADMINISTRATIVE | Facility: HOSPITAL | Age: 65
End: 2020-09-10

## 2020-09-10 NOTE — PROGRESS NOTES
Working uncontrolled HGA1C report    Offered to schedule annual exam, follow up diabetes with lab and eye exam. Declined saying he does not use [facility] any more. Chart up dated.

## 2020-11-18 DIAGNOSIS — N18.4 CKD (CHRONIC KIDNEY DISEASE), STAGE IV: Primary | ICD-10-CM

## 2020-12-01 ENCOUNTER — TELEPHONE (OUTPATIENT)
Dept: NEPHROLOGY | Facility: CLINIC | Age: 65
End: 2020-12-01

## 2020-12-01 NOTE — TELEPHONE ENCOUNTER
----- Message from Madyson Murray sent at 12/1/2020  8:36 AM CST -----  Pt is calling regarding not receiving the mail of orders for lab work. Would like to speak with nurse. Please call back at [phone]

## 2020-12-11 LAB — PTH-INTACT SERPL-MCNC: 52 PG/ML (ref 14–64)

## 2020-12-16 ENCOUNTER — OFFICE VISIT (OUTPATIENT)
Dept: NEPHROLOGY | Facility: CLINIC | Age: 65
End: 2020-12-16
Payer: MEDICARE

## 2020-12-16 VITALS
WEIGHT: 206.13 LBS | SYSTOLIC BLOOD PRESSURE: 126 MMHG | HEART RATE: 102 BPM | HEIGHT: 67 IN | BODY MASS INDEX: 32.35 KG/M2 | DIASTOLIC BLOOD PRESSURE: 78 MMHG

## 2020-12-16 DIAGNOSIS — N18.9 ANEMIA ASSOCIATED WITH CHRONIC RENAL FAILURE: ICD-10-CM

## 2020-12-16 DIAGNOSIS — R80.9 PROTEINURIA DUE TO TYPE 2 DIABETES MELLITUS: ICD-10-CM

## 2020-12-16 DIAGNOSIS — E11.29 PROTEINURIA DUE TO TYPE 2 DIABETES MELLITUS: ICD-10-CM

## 2020-12-16 DIAGNOSIS — D63.1 ANEMIA ASSOCIATED WITH CHRONIC RENAL FAILURE: ICD-10-CM

## 2020-12-16 DIAGNOSIS — N18.4 CKD (CHRONIC KIDNEY DISEASE) STAGE 4, GFR 15-29 ML/MIN: Primary | ICD-10-CM

## 2020-12-16 PROCEDURE — 1159F PR MEDICATION LIST DOCUMENTED IN MEDICAL RECORD: ICD-10-PCS | Mod: S$GLB,,, | Performed by: INTERNAL MEDICINE

## 2020-12-16 PROCEDURE — 3074F SYST BP LT 130 MM HG: CPT | Mod: CPTII,S$GLB,, | Performed by: INTERNAL MEDICINE

## 2020-12-16 PROCEDURE — 99214 PR OFFICE/OUTPT VISIT, EST, LEVL IV, 30-39 MIN: ICD-10-PCS | Mod: S$GLB,,, | Performed by: INTERNAL MEDICINE

## 2020-12-16 PROCEDURE — 1101F PT FALLS ASSESS-DOCD LE1/YR: CPT | Mod: CPTII,S$GLB,, | Performed by: INTERNAL MEDICINE

## 2020-12-16 PROCEDURE — 3008F BODY MASS INDEX DOCD: CPT | Mod: CPTII,S$GLB,, | Performed by: INTERNAL MEDICINE

## 2020-12-16 PROCEDURE — 1101F PR PT FALLS ASSESS DOC 0-1 FALLS W/OUT INJ PAST YR: ICD-10-PCS | Mod: CPTII,S$GLB,, | Performed by: INTERNAL MEDICINE

## 2020-12-16 PROCEDURE — 3078F PR MOST RECENT DIASTOLIC BLOOD PRESSURE < 80 MM HG: ICD-10-PCS | Mod: CPTII,S$GLB,, | Performed by: INTERNAL MEDICINE

## 2020-12-16 PROCEDURE — 3288F FALL RISK ASSESSMENT DOCD: CPT | Mod: CPTII,S$GLB,, | Performed by: INTERNAL MEDICINE

## 2020-12-16 PROCEDURE — 99214 OFFICE O/P EST MOD 30 MIN: CPT | Mod: S$GLB,,, | Performed by: INTERNAL MEDICINE

## 2020-12-16 PROCEDURE — 3074F PR MOST RECENT SYSTOLIC BLOOD PRESSURE < 130 MM HG: ICD-10-PCS | Mod: CPTII,S$GLB,, | Performed by: INTERNAL MEDICINE

## 2020-12-16 PROCEDURE — 1126F AMNT PAIN NOTED NONE PRSNT: CPT | Mod: S$GLB,,, | Performed by: INTERNAL MEDICINE

## 2020-12-16 PROCEDURE — 3008F PR BODY MASS INDEX (BMI) DOCUMENTED: ICD-10-PCS | Mod: CPTII,S$GLB,, | Performed by: INTERNAL MEDICINE

## 2020-12-16 PROCEDURE — 3288F PR FALLS RISK ASSESSMENT DOCUMENTED: ICD-10-PCS | Mod: CPTII,S$GLB,, | Performed by: INTERNAL MEDICINE

## 2020-12-16 PROCEDURE — 3078F DIAST BP <80 MM HG: CPT | Mod: CPTII,S$GLB,, | Performed by: INTERNAL MEDICINE

## 2020-12-16 PROCEDURE — 99999 PR PBB SHADOW E&M-EST. PATIENT-LVL IV: CPT | Mod: PBBFAC,,, | Performed by: INTERNAL MEDICINE

## 2020-12-16 PROCEDURE — 1159F MED LIST DOCD IN RCRD: CPT | Mod: S$GLB,,, | Performed by: INTERNAL MEDICINE

## 2020-12-16 PROCEDURE — 99999 PR PBB SHADOW E&M-EST. PATIENT-LVL IV: ICD-10-PCS | Mod: PBBFAC,,, | Performed by: INTERNAL MEDICINE

## 2020-12-16 PROCEDURE — 1126F PR PAIN SEVERITY QUANTIFIED, NO PAIN PRESENT: ICD-10-PCS | Mod: S$GLB,,, | Performed by: INTERNAL MEDICINE

## 2020-12-16 RX ORDER — FERROUS GLUCONATE 324(38)MG
1 TABLET ORAL DAILY
COMMUNITY
Start: 2020-01-27

## 2020-12-16 RX ORDER — ATORVASTATIN CALCIUM 40 MG/1
1 TABLET, FILM COATED ORAL DAILY
COMMUNITY
Start: 2020-10-12

## 2020-12-16 RX ORDER — SEMAGLUTIDE 1.34 MG/ML
0.5 INJECTION, SOLUTION SUBCUTANEOUS WEEKLY
COMMUNITY
Start: 2020-10-13

## 2020-12-16 NOTE — PROGRESS NOTES
PROGRESS NOTE FOR ESTABLISHED PATIENT    PHYSICIAN REQUESTING THE CONSULT: Dr. Octavio Rouse    REASON FOR CONSULTATION: Renal insufficiency    65 y.o. male with history of retroperitoneal fibrosis, HTN, gout, colon cancer, DM2, nephrolithiasis, anemia, incontinence presents to the renal clinic for evaluation of renal insufficiency.     July 31, 2019:  Patient today presents with bilateral nephrostomy tubes and bilateral ureteral stents. He has follow-up with Dr. So (Urology) on regular basis. Now on Candesartan for proteinuria.     December 16, 2020:  Patient reports that ureteral stents have been removed. Now only with bilateral nephrostomy tubes that will be changed again in 1/2021 by Dr. So. Patient reports urinary leakage. Urology is evaluating him for this. No other complaints at present. Last creatinine was 3 (10/12/20).           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     DM type 2 with diabetic mixed hyperlipidemia     History of colon cancer 2005    History of gout     Hyperlipidemia associated with type 2 diabetes mellitus     Hypertension associated with diabetes     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       Past Surgical History:   Procedure Laterality Date    s/p exp lap times three      complications of colon cancer surgery    S/P partial coloectomy      with colostomy d/t colon cancer       Review of patient's allergies indicates:   Allergen Reactions    Hydrocodone-acetaminophen      Other reaction(s): Rash    Metformin      Advised no metformin due to kidneys    Diazepam Itching and Rash     Other reaction(s): Itching  Other reaction(s): Rash    Morphine Rash     Other  "reaction(s): Rash  Other reaction(s): Rash  Other reaction(s): Rash    Propoxyphene n-acetaminophen Rash     Other reaction(s): Rash    Propoxyphene-acetaminophen Rash       Current Outpatient Medications   Medication Sig Dispense Refill    allopurinol (ZYLOPRIM) 300 MG tablet TAKE ONE TABLET BY MOUTH ONCE DAILY 90 tablet 3    aspirin 81 MG Chew Take 81 mg by mouth daily as needed.       atorvastatin (LIPITOR) 40 MG tablet Take 1 tablet by mouth once daily.      blood sugar diagnostic (TRUETEST TEST STRIPS) Strp 1 strip by Misc.(Non-Drug; Combo Route) route 2 (two) times daily. 200 strip 3    fenofibrate 160 MG Tab TAKE ONE TABLET BY MOUTH ONCE DAILY IN THE MORNING 30 tablet 11    ferrous gluconate (FERGON) 324 MG tablet Take 1 tablet by mouth once daily.      glimepiride (AMARYL) 2 MG tablet Take 1 tablet (2 mg total) by mouth before breakfast. 90 tablet 3    insulin (BASAGLAR KWIKPEN U-100 INSULIN) glargine 100 units/mL (3mL) SubQ pen Inject 35 Units into the skin every evening. 1 Box 11    metoprolol succinate (TOPROL-XL) 100 MG 24 hr tablet Take 1 tablet (100 mg total) by mouth once daily. 30 tablet 11    multivitamin (THERAGRAN) per tablet Take by mouth. 1 Tablet Oral Every day      mupirocin (BACTROBAN) 2 % ointment APPLY  OINTMENT EXTERNALLY THREE TIMES DAILY 22 g 2    niacin 500 MG Tab Take 100 mg by mouth 2 (two) times daily with meals.       nystatin-triamcinolone (MYCOLOG II) cream       pen needle, diabetic 33 gauge x 5/32" Ndle 1 Units by Misc.(Non-Drug; Combo Route) route once daily. 100 each 3    promethazine (PHENERGAN) 25 MG tablet Take 1 tablet (25 mg total) by mouth every 6 (six) hours as needed for Nausea. 30 tablet 5    semaglutide (OZEMPIC) 0.25 mg or 0.5 mg(2 mg/1.5 mL) PnIj Inject 0.5 mg into the skin once a week.      sodium bicarbonate 650 MG tablet Take 1,300 mg by mouth 2 (two) times daily.  0    tamsulosin (FLOMAX) 0.4 mg Cp24 Take by mouth. 1 Capsule, Ext Release 24 " hr Oral At bedtime       No current facility-administered medications for this visit.        Family History   Problem Relation Age of Onset    Diabetes Mother        Social History     Socioeconomic History    Marital status:      Spouse name: Not on file    Number of children: Not on file    Years of education: Not on file    Highest education level: Not on file   Occupational History    Not on file   Social Needs    Financial resource strain: Not on file    Food insecurity     Worry: Not on file     Inability: Not on file    Transportation needs     Medical: Not on file     Non-medical: Not on file   Tobacco Use    Smoking status: Former Smoker     Packs/day: 2.50     Years: 35.00     Pack years: 87.50     Types: Cigarettes     Quit date: 7/7/2005     Years since quitting: 15.4    Smokeless tobacco: Never Used   Substance and Sexual Activity    Alcohol use: No    Drug use: No    Sexual activity: Never   Lifestyle    Physical activity     Days per week: Not on file     Minutes per session: Not on file    Stress: Not on file   Relationships    Social connections     Talks on phone: Not on file     Gets together: Not on file     Attends Mandaeism service: Not on file     Active member of club or organization: Not on file     Attends meetings of clubs or organizations: Not on file     Relationship status: Not on file   Other Topics Concern    Not on file   Social History Narrative    Not on file       Review of Systems:  1. GENERAL: patient denies any fever, weight changes, generalized weakness, dizziness.  2. HEENT: patient denies headaches, visual disturbances, swallowing problems, sinus pain, nasal congestion.  3. CARDIOVASCULAR: patient denies chest pain, palpitations.  4. PULMONARY: patient denies SOB, coughing, hemoptysis, wheezing.  5. GASTROINTESTINAL: patient denies abdominal pain, nausea, vomiting, diarrhea, colostomy bag in place  6. GENITOURINARY: patient denies dysuria, hematuria  "no hesitancy, frequency.  7. EXTREMITIES: patient denies LE edema, LE cramping.  8. DERMATOLOGY: patient denies rashes, ulcers.  9. NEURO: patient denies tremors, extremity weakness, extremity numbness/tingling.  10. MUSCULOSKELETAL: patient denies joint pain, joint swelling.  11. HEMATOLOGY: patient denies rectal or gum bleeding.  12: PSYCH: patient denies anxiety, depression.      PHYSICAL EXAM:    /78   Pulse 102   Ht 5' 6.5" (1.689 m)   Wt 93.5 kg (206 lb 2.1 oz)   BMI 32.77 kg/m²     GENERAL: Pleasant gentleman presents to clinic with non-labored breathing.  HEENT: PER, no nasal discharge, no icterus, no oral exudates, moist mucosal membranes, bilateral shoulder fat pads  NECK: no thyroid mass, no lymphadenopathy.  HEART: RRR S1/S2, no rubs, good peripheral pulses.  LUNGS: CTA bilaterally, no wheezing, breathing is nonlabored.  ABDOMEN: soft, nontender, not distended, bowel sounds are present, no abdominal hernia, right sided colostomy (with bag), bilateral nephrostomy tubes in place  EXTREM: mild bilateral ankle edema.  SKIN: no rashes, skin is warm and dry.  NEURO: A & O x 3, no obvious focal signs.    LABORATORY RESULTS:    Lab Results   Component Value Date    CREATININE 2.2 (H) 09/19/2019    BUN 34 (H) 09/19/2019     09/19/2019    K 4.9 09/19/2019     09/19/2019    CO2 24 09/19/2019      Lab Results   Component Value Date    PTH 52 12/10/2020    CALCIUM 9.2 09/19/2019    PHOS 4.3 06/28/2019     Lab Results   Component Value Date    ALBUMIN 2.6 (L) 09/19/2019     Lab Results   Component Value Date    WBC 7.51 09/19/2019    HGB 7.7 (L) 09/19/2019    HCT 26.5 (L) 09/19/2019    MCV 86 09/19/2019     (H) 09/19/2019     Protein Creatinine Ratios:    Creatinine, Urine   Date Value Ref Range Status   06/28/2019 87.0 23.0 - 375.0 mg/dL Final     Comment:     The random urine reference ranges provided were established   for 24 hour urine collections.  No reference ranges exist " for  random urine specimens.  Correlate clinically.     12/05/2018 91.0 23.0 - 375.0 mg/dL Final     Comment:     The random urine reference ranges provided were established   for 24 hour urine collections.  No reference ranges exist for  random urine specimens.  Correlate clinically.     10/10/2018 113.0 23.0 - 375.0 mg/dL Final     Comment:     The random urine reference ranges provided were established   for 24 hour urine collections.  No reference ranges exist for  random urine specimens.  Correlate clinically.       Protein, Urine Random   Date Value Ref Range Status   06/28/2019 714 (H) 0 - 15 mg/dL Final     Comment:     The random urine reference ranges provided were established   for 24 hour urine collections.  No reference ranges exist for  random urine specimens.  Correlate clinically.     12/05/2018 341 (H) 0 - 15 mg/dL Final     Comment:     The random urine reference ranges provided were established   for 24 hour urine collections.  No reference ranges exist for  random urine specimens.  Correlate clinically.     10/10/2018 814 (H) 0 - 15 mg/dL Final     Comment:     The random urine reference ranges provided were established   for 24 hour urine collections.  No reference ranges exist for  random urine specimens.  Correlate clinically.       Prot/Creat Ratio, Urine   Date Value Ref Range Status   06/28/2019 8.21 (H) 0.00 - 0.20 Final   12/05/2018 3.75 (H) 0.00 - 0.20 Final   10/10/2018 7.20 (H) 0.00 - 0.20 Final     Non-[facility] labs from 10/12/20:  Cr-3, K-4.7, CO2-19, Ca-8.6  Hgb 9.5      ASSESSMENT AND PLAN:  65 y.o. male with history of retroperitoneal fibrosis, HTN, gout, colon cancer, DM2, nephrolithiasis, anemia, incontinence presents to the renal clinic for evaluation of renal insufficiency.    1. Renal insufficiency: Patient presents with renal insufficiency, consistent with CKD stage 4. Last creatinine was 3 ()10/12/20). Cause of his renal insufficiency is likely multifactorial and related to  his long-standing diabetes (he presents with proteinuria) and retroperitoneal fibrosis.  He was on Candesartan in past. This has been stopped because of borderline hyperkalemia and intermittent ROZ. Monitor proteinuria closely.  His ureteral stents have been removed. He now presents with bilateral nephrostomy tubes only. Patient was advised to avoid NSAID pain medications such as advil, aleve, motrin, ibuprofen, naprosyn, meloxicam, diclofenac, mobic and to hydrate with 60-80 ounces of water per day.     2. Electrolytes: at goal.     3. Acid base status: mild acidosis has resolved.    4. Volume: Mild LE edema. Monitor.     5. Hypertension: good BP control.     6. Medications: Reviewed.     7. DM2: continue Insulin.    8. Retroperitoneal fibrosis: he has regular follow-up with Dr. So (Urology). Bilateral nephrostomy tubes in place.         RTC in 2 months.

## 2023-02-14 ENCOUNTER — LAB VISIT (OUTPATIENT)
Dept: LAB | Facility: HOSPITAL | Age: 68
End: 2023-02-14
Payer: MEDICARE

## 2023-02-14 DIAGNOSIS — N13.30 HYDRONEPHROSIS: ICD-10-CM

## 2023-02-14 DIAGNOSIS — N39.0 URINARY TRACT INFECTION, SITE NOT SPECIFIED: ICD-10-CM

## 2023-02-14 LAB
ALBUMIN SERPL BCP-MCNC: 3.5 G/DL (ref 3.5–5.2)
ALP SERPL-CCNC: 43 U/L (ref 55–135)
ALT SERPL W/O P-5'-P-CCNC: 11 U/L (ref 10–44)
ANION GAP SERPL CALC-SCNC: 13 MMOL/L (ref 8–16)
AST SERPL-CCNC: 16 U/L (ref 10–40)
BASOPHILS # BLD AUTO: 0.04 K/UL (ref 0–0.2)
BASOPHILS NFR BLD: 0.6 % (ref 0–1.9)
BILIRUB SERPL-MCNC: 0.2 MG/DL (ref 0.1–1)
BUN SERPL-MCNC: 83 MG/DL (ref 8–23)
CALCIUM SERPL-MCNC: 9.4 MG/DL (ref 8.7–10.5)
CHLORIDE SERPL-SCNC: 111 MMOL/L (ref 95–110)
CO2 SERPL-SCNC: 12 MMOL/L (ref 23–29)
CREAT SERPL-MCNC: 3.1 MG/DL (ref 0.5–1.4)
CRP SERPL-MCNC: 22.1 MG/L (ref 0–8.2)
DIFFERENTIAL METHOD: ABNORMAL
EOSINOPHIL # BLD AUTO: 0.5 K/UL (ref 0–0.5)
EOSINOPHIL NFR BLD: 7.6 % (ref 0–8)
ERYTHROCYTE [DISTWIDTH] IN BLOOD BY AUTOMATED COUNT: 16 % (ref 11.5–14.5)
ERYTHROCYTE [SEDIMENTATION RATE] IN BLOOD BY PHOTOMETRIC METHOD: 73 MM/HR (ref 0–23)
EST. GFR  (NO RACE VARIABLE): 21 ML/MIN/1.73 M^2
GLUCOSE SERPL-MCNC: 92 MG/DL (ref 70–110)
HCT VFR BLD AUTO: 29 % (ref 40–54)
HGB BLD-MCNC: 9.4 G/DL (ref 14–18)
IMM GRANULOCYTES # BLD AUTO: 0.02 K/UL (ref 0–0.04)
IMM GRANULOCYTES NFR BLD AUTO: 0.3 % (ref 0–0.5)
LYMPHOCYTES # BLD AUTO: 2.2 K/UL (ref 1–4.8)
LYMPHOCYTES NFR BLD: 32.7 % (ref 18–48)
MCH RBC QN AUTO: 28.1 PG (ref 27–31)
MCHC RBC AUTO-ENTMCNC: 32.4 G/DL (ref 32–36)
MCV RBC AUTO: 87 FL (ref 82–98)
MONOCYTES # BLD AUTO: 0.7 K/UL (ref 0.3–1)
MONOCYTES NFR BLD: 10.4 % (ref 4–15)
NEUTROPHILS # BLD AUTO: 3.3 K/UL (ref 1.8–7.7)
NEUTROPHILS NFR BLD: 48.4 % (ref 38–73)
NRBC BLD-RTO: 0 /100 WBC
PLATELET # BLD AUTO: 427 K/UL (ref 150–450)
PMV BLD AUTO: 10.9 FL (ref 9.2–12.9)
POTASSIUM SERPL-SCNC: 5.2 MMOL/L (ref 3.5–5.1)
PROT SERPL-MCNC: 7.9 G/DL (ref 6–8.4)
RBC # BLD AUTO: 3.34 M/UL (ref 4.6–6.2)
SODIUM SERPL-SCNC: 136 MMOL/L (ref 136–145)
WBC # BLD AUTO: 6.83 K/UL (ref 3.9–12.7)

## 2023-02-14 PROCEDURE — 86140 C-REACTIVE PROTEIN: CPT | Performed by: INTERNAL MEDICINE

## 2023-02-14 PROCEDURE — 80053 COMPREHEN METABOLIC PANEL: CPT | Performed by: INTERNAL MEDICINE

## 2023-02-14 PROCEDURE — 85652 RBC SED RATE AUTOMATED: CPT | Performed by: INTERNAL MEDICINE

## 2023-02-14 PROCEDURE — 85025 COMPLETE CBC W/AUTO DIFF WBC: CPT | Performed by: INTERNAL MEDICINE

## 2023-02-20 ENCOUNTER — LAB VISIT (OUTPATIENT)
Dept: LAB | Facility: HOSPITAL | Age: 68
End: 2023-02-20
Payer: MEDICARE

## 2023-02-20 DIAGNOSIS — N39.0 URINARY TRACT INFECTION, SITE NOT SPECIFIED: ICD-10-CM

## 2023-02-20 DIAGNOSIS — N13.30 HYDRONEPHROSIS: ICD-10-CM

## 2023-02-20 LAB
ALBUMIN SERPL BCP-MCNC: 3.3 G/DL (ref 3.5–5.2)
ALP SERPL-CCNC: 41 U/L (ref 55–135)
ALT SERPL W/O P-5'-P-CCNC: 9 U/L (ref 10–44)
ANION GAP SERPL CALC-SCNC: 13 MMOL/L (ref 8–16)
AST SERPL-CCNC: 21 U/L (ref 10–40)
BASOPHILS # BLD AUTO: 0.05 K/UL (ref 0–0.2)
BASOPHILS NFR BLD: 0.7 % (ref 0–1.9)
BILIRUB SERPL-MCNC: 0.2 MG/DL (ref 0.1–1)
BUN SERPL-MCNC: 90 MG/DL (ref 8–23)
CALCIUM SERPL-MCNC: 9.1 MG/DL (ref 8.7–10.5)
CHLORIDE SERPL-SCNC: 111 MMOL/L (ref 95–110)
CO2 SERPL-SCNC: 11 MMOL/L (ref 23–29)
CREAT SERPL-MCNC: 3 MG/DL (ref 0.5–1.4)
CRP SERPL-MCNC: 21 MG/L (ref 0–8.2)
DIFFERENTIAL METHOD: ABNORMAL
EOSINOPHIL # BLD AUTO: 0.6 K/UL (ref 0–0.5)
EOSINOPHIL NFR BLD: 9 % (ref 0–8)
ERYTHROCYTE [DISTWIDTH] IN BLOOD BY AUTOMATED COUNT: 15.3 % (ref 11.5–14.5)
ERYTHROCYTE [SEDIMENTATION RATE] IN BLOOD BY PHOTOMETRIC METHOD: 86 MM/HR (ref 0–23)
EST. GFR  (NO RACE VARIABLE): 22 ML/MIN/1.73 M^2
GLUCOSE SERPL-MCNC: 159 MG/DL (ref 70–110)
HCT VFR BLD AUTO: 26.7 % (ref 40–54)
HGB BLD-MCNC: 8.8 G/DL (ref 14–18)
IMM GRANULOCYTES # BLD AUTO: 0.02 K/UL (ref 0–0.04)
IMM GRANULOCYTES NFR BLD AUTO: 0.3 % (ref 0–0.5)
LYMPHOCYTES # BLD AUTO: 2.1 K/UL (ref 1–4.8)
LYMPHOCYTES NFR BLD: 30.6 % (ref 18–48)
MCH RBC QN AUTO: 27.9 PG (ref 27–31)
MCHC RBC AUTO-ENTMCNC: 33 G/DL (ref 32–36)
MCV RBC AUTO: 85 FL (ref 82–98)
MONOCYTES # BLD AUTO: 0.6 K/UL (ref 0.3–1)
MONOCYTES NFR BLD: 8.8 % (ref 4–15)
NEUTROPHILS # BLD AUTO: 3.4 K/UL (ref 1.8–7.7)
NEUTROPHILS NFR BLD: 50.6 % (ref 38–73)
NRBC BLD-RTO: 0 /100 WBC
PLATELET # BLD AUTO: 462 K/UL (ref 150–450)
PMV BLD AUTO: 10.3 FL (ref 9.2–12.9)
POTASSIUM SERPL-SCNC: 5.3 MMOL/L (ref 3.5–5.1)
PROT SERPL-MCNC: 7.5 G/DL (ref 6–8.4)
RBC # BLD AUTO: 3.15 M/UL (ref 4.6–6.2)
SODIUM SERPL-SCNC: 135 MMOL/L (ref 136–145)
WBC # BLD AUTO: 6.69 K/UL (ref 3.9–12.7)

## 2023-02-20 PROCEDURE — 80053 COMPREHEN METABOLIC PANEL: CPT | Performed by: INTERNAL MEDICINE

## 2023-02-20 PROCEDURE — 85025 COMPLETE CBC W/AUTO DIFF WBC: CPT | Performed by: INTERNAL MEDICINE

## 2023-02-20 PROCEDURE — 86140 C-REACTIVE PROTEIN: CPT | Performed by: INTERNAL MEDICINE

## 2023-02-20 PROCEDURE — 85652 RBC SED RATE AUTOMATED: CPT | Performed by: INTERNAL MEDICINE

## 2023-02-22 PROCEDURE — G0179 MD RECERTIFICATION HHA PT: HCPCS | Mod: ,,, | Performed by: INTERNAL MEDICINE

## 2023-02-22 PROCEDURE — G0179 PR HOME HEALTH MD RECERTIFICATION: ICD-10-PCS | Mod: ,,, | Performed by: INTERNAL MEDICINE

## 2023-03-06 ENCOUNTER — LAB VISIT (OUTPATIENT)
Dept: LAB | Facility: HOSPITAL | Age: 68
End: 2023-03-06
Attending: INTERNAL MEDICINE
Payer: MEDICARE

## 2023-03-06 DIAGNOSIS — N13.30 HYDRONEPHROSIS: ICD-10-CM

## 2023-03-06 DIAGNOSIS — N39.0 URINARY TRACT INFECTION, SITE NOT SPECIFIED: ICD-10-CM

## 2023-03-06 PROCEDURE — 86140 C-REACTIVE PROTEIN: CPT | Performed by: INTERNAL MEDICINE

## 2023-03-06 PROCEDURE — 80053 COMPREHEN METABOLIC PANEL: CPT | Performed by: INTERNAL MEDICINE

## 2023-03-06 PROCEDURE — 85652 RBC SED RATE AUTOMATED: CPT | Performed by: INTERNAL MEDICINE

## 2023-03-06 PROCEDURE — 85027 COMPLETE CBC AUTOMATED: CPT | Performed by: INTERNAL MEDICINE

## 2023-03-07 LAB
ALBUMIN SERPL BCP-MCNC: 3 G/DL (ref 3.5–5.2)
ALP SERPL-CCNC: 68 U/L (ref 55–135)
ALT SERPL W/O P-5'-P-CCNC: 21 U/L (ref 10–44)
ANION GAP SERPL CALC-SCNC: 12 MMOL/L (ref 8–16)
AST SERPL-CCNC: 23 U/L (ref 10–40)
BILIRUB SERPL-MCNC: 0.2 MG/DL (ref 0.1–1)
BUN SERPL-MCNC: 69 MG/DL (ref 8–23)
CALCIUM SERPL-MCNC: 8.7 MG/DL (ref 8.7–10.5)
CHLORIDE SERPL-SCNC: 108 MMOL/L (ref 95–110)
CO2 SERPL-SCNC: 19 MMOL/L (ref 23–29)
CREAT SERPL-MCNC: 2.5 MG/DL (ref 0.5–1.4)
CRP SERPL-MCNC: 26 MG/L (ref 0–8.2)
ERYTHROCYTE [DISTWIDTH] IN BLOOD BY AUTOMATED COUNT: 15.5 % (ref 11.5–14.5)
ERYTHROCYTE [SEDIMENTATION RATE] IN BLOOD BY PHOTOMETRIC METHOD: 60 MM/HR (ref 0–23)
EST. GFR  (NO RACE VARIABLE): 27.3 ML/MIN/1.73 M^2
GLUCOSE SERPL-MCNC: 246 MG/DL (ref 70–110)
HCT VFR BLD AUTO: 26.4 % (ref 40–54)
HGB BLD-MCNC: 8.4 G/DL (ref 14–18)
MCH RBC QN AUTO: 28 PG (ref 27–31)
MCHC RBC AUTO-ENTMCNC: 31.8 G/DL (ref 32–36)
MCV RBC AUTO: 88 FL (ref 82–98)
PLATELET # BLD AUTO: 410 K/UL (ref 150–450)
PMV BLD AUTO: 11.3 FL (ref 9.2–12.9)
POTASSIUM SERPL-SCNC: 5.4 MMOL/L (ref 3.5–5.1)
PROT SERPL-MCNC: 7 G/DL (ref 6–8.4)
RBC # BLD AUTO: 3 M/UL (ref 4.6–6.2)
SODIUM SERPL-SCNC: 139 MMOL/L (ref 136–145)
WBC # BLD AUTO: 7.16 K/UL (ref 3.9–12.7)

## 2023-03-16 ENCOUNTER — DOCUMENT SCAN (OUTPATIENT)
Dept: HOME HEALTH SERVICES | Facility: HOSPITAL | Age: 68
End: 2023-03-16
Payer: MEDICARE

## 2023-04-19 ENCOUNTER — EXTERNAL HOME HEALTH (OUTPATIENT)
Dept: HOME HEALTH SERVICES | Facility: HOSPITAL | Age: 68
End: 2023-04-19
Payer: MEDICARE